# Patient Record
Sex: MALE | Race: WHITE | URBAN - METROPOLITAN AREA
[De-identification: names, ages, dates, MRNs, and addresses within clinical notes are randomized per-mention and may not be internally consistent; named-entity substitution may affect disease eponyms.]

---

## 2018-08-11 ENCOUNTER — TRANSFERRED RECORDS (OUTPATIENT)
Dept: HEALTH INFORMATION MANAGEMENT | Facility: CLINIC | Age: 28
End: 2018-08-11

## 2020-07-19 ENCOUNTER — TRANSFERRED RECORDS (OUTPATIENT)
Dept: HEALTH INFORMATION MANAGEMENT | Facility: CLINIC | Age: 30
End: 2020-07-19

## 2024-01-01 ENCOUNTER — APPOINTMENT (OUTPATIENT)
Dept: CARDIOLOGY | Facility: CLINIC | Age: 34
DRG: 407 | End: 2024-01-01
Payer: COMMERCIAL

## 2024-01-01 ENCOUNTER — APPOINTMENT (OUTPATIENT)
Dept: GENERAL RADIOLOGY | Facility: CLINIC | Age: 34
DRG: 407 | End: 2024-01-01
Payer: COMMERCIAL

## 2024-01-01 PROCEDURE — 71045 X-RAY EXAM CHEST 1 VIEW: CPT

## 2024-01-01 PROCEDURE — 93306 TTE W/DOPPLER COMPLETE: CPT | Mod: 26 | Performed by: INTERNAL MEDICINE

## 2024-01-01 PROCEDURE — 71045 X-RAY EXAM CHEST 1 VIEW: CPT | Mod: 76

## 2024-01-01 PROCEDURE — 71045 X-RAY EXAM CHEST 1 VIEW: CPT | Mod: 26 | Performed by: RADIOLOGY

## 2024-01-01 PROCEDURE — 999N000208 ECHOCARDIOGRAM COMPLETE

## 2024-12-26 ENCOUNTER — HOSPITAL ENCOUNTER (INPATIENT)
Facility: CLINIC | Age: 34
End: 2024-12-26
Attending: SURGERY | Admitting: SURGERY
Payer: COMMERCIAL

## 2024-12-26 LAB
ALBUMIN SERPL BCG-MCNC: 3.1 G/DL (ref 3.5–5.2)
ALLEN'S TEST: ABNORMAL
ALLEN'S TEST: ABNORMAL
ALP SERPL-CCNC: 53 U/L (ref 40–150)
ALT SERPL W P-5'-P-CCNC: 9 U/L (ref 0–70)
AMYLASE SERPL-CCNC: 42 U/L (ref 28–100)
ANION GAP SERPL CALCULATED.3IONS-SCNC: 8 MMOL/L (ref 7–15)
APTT PPP: 29 SECONDS (ref 22–38)
AST SERPL W P-5'-P-CCNC: 27 U/L (ref 0–45)
ATRIAL RATE - MUSE: 78 BPM
BASE EXCESS BLDA CALC-SCNC: 2 MMOL/L (ref -3–3)
BASE EXCESS BLDA CALC-SCNC: 2.2 MMOL/L (ref -3–3)
BASOPHILS # BLD AUTO: 0 10E3/UL (ref 0–0.2)
BASOPHILS NFR BLD AUTO: 0 %
BILIRUB DIRECT SERPL-MCNC: 0.22 MG/DL (ref 0–0.3)
BILIRUB SERPL-MCNC: 0.6 MG/DL
BLD GP AB SCN SERPL QL: NEGATIVE
BLD PROD TYP BPU: NORMAL
BLOOD COMPONENT TYPE: NORMAL
BUN SERPL-MCNC: 9.3 MG/DL (ref 6–20)
CA-I BLD-MCNC: 4.6 MG/DL (ref 4.4–5.2)
CALCIUM SERPL-MCNC: 8.3 MG/DL (ref 8.8–10.4)
CHLORIDE SERPL-SCNC: 110 MMOL/L (ref 98–107)
CK SERPL-CCNC: 196 U/L (ref 39–308)
CODING SYSTEM: NORMAL
CREAT SERPL-MCNC: 0.66 MG/DL (ref 0.67–1.17)
CROSSMATCH: NORMAL
DIASTOLIC BLOOD PRESSURE - MUSE: NORMAL MMHG
EGFRCR SERPLBLD CKD-EPI 2021: >90 ML/MIN/1.73M2
EOSINOPHIL # BLD AUTO: 0 10E3/UL (ref 0–0.7)
EOSINOPHIL NFR BLD AUTO: 0 %
ERYTHROCYTE [DISTWIDTH] IN BLOOD BY AUTOMATED COUNT: 15.9 % (ref 10–15)
FIBRINOGEN PPP-MCNC: 566 MG/DL (ref 170–510)
GGT SERPL-CCNC: 49 U/L (ref 8–61)
GLUCOSE BLDC GLUCOMTR-MCNC: 127 MG/DL (ref 70–99)
GLUCOSE BLDC GLUCOMTR-MCNC: 137 MG/DL (ref 70–99)
GLUCOSE BLDC GLUCOMTR-MCNC: 138 MG/DL (ref 70–99)
GLUCOSE SERPL-MCNC: 161 MG/DL (ref 70–99)
GRAM STAIN RESULT: ABNORMAL
GRAM STAIN RESULT: ABNORMAL
HCO3 BLD-SCNC: 26 MMOL/L (ref 21–28)
HCO3 BLD-SCNC: 26 MMOL/L (ref 21–28)
HCO3 SERPL-SCNC: 24 MMOL/L (ref 22–29)
HCT VFR BLD AUTO: 20.1 % (ref 40–53)
HGB BLD-MCNC: 6.7 G/DL (ref 13.3–17.7)
IMM GRANULOCYTES # BLD: 0.1 10E3/UL
IMM GRANULOCYTES NFR BLD: 1 %
INR PPP: 1.1 (ref 0.85–1.15)
INTERPRETATION ECG - MUSE: NORMAL
ISSUE DATE AND TIME: NORMAL
LACTATE SERPL-SCNC: 1.6 MMOL/L (ref 0.7–2)
LDH SERPL L TO P-CCNC: 241 U/L (ref 0–250)
LIPASE SERPL-CCNC: 70 U/L (ref 13–60)
LYMPHOCYTES # BLD AUTO: 0.7 10E3/UL (ref 0.8–5.3)
LYMPHOCYTES NFR BLD AUTO: 7 %
MAGNESIUM SERPL-MCNC: 2.2 MG/DL (ref 1.7–2.3)
MCH RBC QN AUTO: 30.7 PG (ref 26.5–33)
MCHC RBC AUTO-ENTMCNC: 33.3 G/DL (ref 31.5–36.5)
MCV RBC AUTO: 92 FL (ref 78–100)
MONOCYTES # BLD AUTO: 0.3 10E3/UL (ref 0–1.3)
MONOCYTES NFR BLD AUTO: 3 %
NEUTROPHILS # BLD AUTO: 9.2 10E3/UL (ref 1.6–8.3)
NEUTROPHILS NFR BLD AUTO: 90 %
NRBC # BLD AUTO: 0 10E3/UL
NRBC BLD AUTO-RTO: 0 /100
O2/TOTAL GAS SETTING VFR VENT: 100 %
O2/TOTAL GAS SETTING VFR VENT: 30 %
OXYHGB MFR BLDA: 97 % (ref 92–100)
OXYHGB MFR BLDA: 99 % (ref 92–100)
P AXIS - MUSE: 65 DEGREES
PCO2 BLD: 36 MM HG (ref 35–45)
PCO2 BLD: 37 MM HG (ref 35–45)
PEEP: 10 CM H2O
PEEP: 5 CM H2O
PH BLD: 7.45 [PH] (ref 7.35–7.45)
PH BLD: 7.46 [PH] (ref 7.35–7.45)
PHOSPHATE SERPL-MCNC: 2.2 MG/DL (ref 2.5–4.5)
PHOSPHATE SERPL-MCNC: 2.7 MG/DL (ref 2.5–4.5)
PLATELET # BLD AUTO: 108 10E3/UL (ref 150–450)
PO2 BLD: 305 MM HG (ref 80–105)
PO2 BLD: 92 MM HG (ref 80–105)
POTASSIUM SERPL-SCNC: 3.8 MMOL/L (ref 3.4–5.3)
PR INTERVAL - MUSE: 136 MS
PROT SERPL-MCNC: 5.3 G/DL (ref 6.4–8.3)
QRS DURATION - MUSE: 96 MS
QT - MUSE: 416 MS
QTC - MUSE: 474 MS
R AXIS - MUSE: 66 DEGREES
RBC # BLD AUTO: 2.18 10E6/UL (ref 4.4–5.9)
SAO2 % BLDA: 99.3 % (ref 96–97)
SAO2 % BLDA: >100 % (ref 96–97)
SODIUM SERPL-SCNC: 142 MMOL/L (ref 135–145)
SPECIMEN EXP DATE BLD: NORMAL
SPECIMEN EXP DATE BLD: NORMAL
SYSTOLIC BLOOD PRESSURE - MUSE: NORMAL MMHG
T AXIS - MUSE: 50 DEGREES
TROPONIN T SERPL HS-MCNC: <6 NG/L
UNIT ABO/RH: NORMAL
UNIT NUMBER: NORMAL
UNIT STATUS: NORMAL
UNIT TYPE ISBT: 5100
VENTRICULAR RATE- MUSE: 78 BPM
WBC # BLD AUTO: 10.3 10E3/UL (ref 4–11)

## 2024-12-26 PROCEDURE — 93005 ELECTROCARDIOGRAM TRACING: CPT

## 2024-12-26 PROCEDURE — 85610 PROTHROMBIN TIME: CPT

## 2024-12-26 PROCEDURE — 85018 HEMOGLOBIN: CPT

## 2024-12-26 PROCEDURE — 94002 VENT MGMT INPAT INIT DAY: CPT

## 2024-12-26 PROCEDURE — 85025 COMPLETE CBC W/AUTO DIFF WBC: CPT

## 2024-12-26 PROCEDURE — 83690 ASSAY OF LIPASE: CPT

## 2024-12-26 PROCEDURE — 84075 ASSAY ALKALINE PHOSPHATASE: CPT

## 2024-12-26 PROCEDURE — 82977 ASSAY OF GGT: CPT

## 2024-12-26 PROCEDURE — 85384 FIBRINOGEN ACTIVITY: CPT

## 2024-12-26 PROCEDURE — 83615 LACTATE (LD) (LDH) ENZYME: CPT

## 2024-12-26 PROCEDURE — 87070 CULTURE OTHR SPECIMN AEROBIC: CPT

## 2024-12-26 PROCEDURE — 258N000003 HC RX IP 258 OP 636

## 2024-12-26 PROCEDURE — 86850 RBC ANTIBODY SCREEN: CPT

## 2024-12-26 PROCEDURE — 200N000002 HC R&B ICU UMMC

## 2024-12-26 PROCEDURE — 82374 ASSAY BLOOD CARBON DIOXIDE: CPT

## 2024-12-26 PROCEDURE — 93010 ELECTROCARDIOGRAM REPORT: CPT | Performed by: INTERNAL MEDICINE

## 2024-12-26 PROCEDURE — 84484 ASSAY OF TROPONIN QUANT: CPT

## 2024-12-26 PROCEDURE — 86923 COMPATIBILITY TEST ELECTRIC: CPT

## 2024-12-26 PROCEDURE — 83735 ASSAY OF MAGNESIUM: CPT

## 2024-12-26 PROCEDURE — 84155 ASSAY OF PROTEIN SERUM: CPT

## 2024-12-26 PROCEDURE — 82805 BLOOD GASES W/O2 SATURATION: CPT

## 2024-12-26 PROCEDURE — 82962 GLUCOSE BLOOD TEST: CPT

## 2024-12-26 PROCEDURE — 82435 ASSAY OF BLOOD CHLORIDE: CPT

## 2024-12-26 PROCEDURE — 82248 BILIRUBIN DIRECT: CPT

## 2024-12-26 PROCEDURE — 250N000011 HC RX IP 250 OP 636

## 2024-12-26 PROCEDURE — 85730 THROMBOPLASTIN TIME PARTIAL: CPT

## 2024-12-26 PROCEDURE — 82247 BILIRUBIN TOTAL: CPT

## 2024-12-26 PROCEDURE — 83605 ASSAY OF LACTIC ACID: CPT

## 2024-12-26 PROCEDURE — 84100 ASSAY OF PHOSPHORUS: CPT

## 2024-12-26 PROCEDURE — 250N000009 HC RX 250

## 2024-12-26 PROCEDURE — 94640 AIRWAY INHALATION TREATMENT: CPT | Mod: 76

## 2024-12-26 PROCEDURE — 250N000011 HC RX IP 250 OP 636: Performed by: SURGERY

## 2024-12-26 PROCEDURE — 94799 UNLISTED PULMONARY SVC/PX: CPT

## 2024-12-26 PROCEDURE — 999N000157 HC STATISTIC RCP TIME EA 10 MIN

## 2024-12-26 PROCEDURE — 86900 BLOOD TYPING SEROLOGIC ABO: CPT

## 2024-12-26 PROCEDURE — 82150 ASSAY OF AMYLASE: CPT

## 2024-12-26 PROCEDURE — 82330 ASSAY OF CALCIUM: CPT

## 2024-12-26 PROCEDURE — 82550 ASSAY OF CK (CPK): CPT

## 2024-12-26 RX ORDER — VANCOMYCIN HYDROCHLORIDE
1500 EVERY 12 HOURS
Status: DISPENSED | OUTPATIENT
Start: 2024-12-26

## 2024-12-26 RX ORDER — NOREPINEPHRINE BITARTRATE 0.06 MG/ML
.01-.6 INJECTION, SOLUTION INTRAVENOUS CONTINUOUS
Status: ACTIVE | OUTPATIENT
Start: 2024-12-26

## 2024-12-26 RX ORDER — IPRATROPIUM BROMIDE AND ALBUTEROL SULFATE 2.5; .5 MG/3ML; MG/3ML
3 SOLUTION RESPIRATORY (INHALATION) EVERY 4 HOURS
Status: DISPENSED | OUTPATIENT
Start: 2024-12-26

## 2024-12-26 RX ORDER — PHENYLEPHRINE HCL IN 0.9% NACL 50MG/250ML
.5-6 PLASTIC BAG, INJECTION (ML) INTRAVENOUS CONTINUOUS
Status: ACTIVE | OUTPATIENT
Start: 2024-12-26

## 2024-12-26 RX ORDER — DEXTROSE MONOHYDRATE AND SODIUM CHLORIDE 5; .45 G/100ML; G/100ML
INJECTION, SOLUTION INTRAVENOUS CONTINUOUS
Status: ACTIVE | OUTPATIENT
Start: 2024-12-26

## 2024-12-26 RX ORDER — IRRIGATION SET
1200 IRRIGATION SET MISCELLANEOUS EVERY 24 HOURS
Status: DISPENSED | OUTPATIENT
Start: 2024-12-27

## 2024-12-26 RX ORDER — HYDROCORTISONE SODIUM SUCCINATE 100 MG/2ML
100 INJECTION INTRAMUSCULAR; INTRAVENOUS EVERY 8 HOURS
Status: DISPENSED | OUTPATIENT
Start: 2024-12-26 | End: 2024-12-28

## 2024-12-26 RX ADMIN — PIPERACILLIN SODIUM AND TAZOBACTAM SODIUM 4.5 G: 4; .5 INJECTION, SOLUTION INTRAVENOUS at 21:59

## 2024-12-26 RX ADMIN — VASOPRESSIN 0.5 UNITS/HR: 20 INJECTION, SOLUTION INTRAMUSCULAR; SUBCUTANEOUS at 19:04

## 2024-12-26 RX ADMIN — DEXTROSE AND SODIUM CHLORIDE: 5; 450 INJECTION, SOLUTION INTRAVENOUS at 19:08

## 2024-12-26 RX ADMIN — IPRATROPIUM BROMIDE AND ALBUTEROL SULFATE 3 ML: .5; 3 SOLUTION RESPIRATORY (INHALATION) at 20:03

## 2024-12-26 RX ADMIN — HYDROCORTISONE SODIUM SUCCINATE 100 MG: 100 INJECTION, POWDER, FOR SOLUTION INTRAMUSCULAR; INTRAVENOUS at 20:55

## 2024-12-26 RX ADMIN — Medication 0.5 MCG/KG/MIN: at 19:06

## 2024-12-26 RX ADMIN — Medication 1500 MG: at 23:07

## 2024-12-26 ASSESSMENT — ACTIVITIES OF DAILY LIVING (ADL)
ADLS_ACUITY_SCORE: 41
ADLS_ACUITY_SCORE: 41
ADLS_ACUITY_SCORE: 59
ADLS_ACUITY_SCORE: 59
ADLS_ACUITY_SCORE: 53

## 2024-12-27 ENCOUNTER — APPOINTMENT (OUTPATIENT)
Dept: GENERAL RADIOLOGY | Facility: CLINIC | Age: 34
DRG: 407 | End: 2024-12-27
Payer: COMMERCIAL

## 2024-12-27 ENCOUNTER — APPOINTMENT (OUTPATIENT)
Dept: CT IMAGING | Facility: CLINIC | Age: 34
DRG: 407 | End: 2024-12-27
Payer: COMMERCIAL

## 2024-12-27 ENCOUNTER — ANESTHESIA EVENT (OUTPATIENT)
Dept: SURGERY | Facility: CLINIC | Age: 34
DRG: 407 | End: 2024-12-27
Payer: COMMERCIAL

## 2024-12-27 VITALS
HEART RATE: 78 BPM | TEMPERATURE: 98.8 F | HEIGHT: 73 IN | BODY MASS INDEX: 34.97 KG/M2 | OXYGEN SATURATION: 99 % | WEIGHT: 263.82 LBS | RESPIRATION RATE: 14 BRPM

## 2024-12-27 LAB
ALBUMIN SERPL BCG-MCNC: 3.1 G/DL (ref 3.5–5.2)
ALBUMIN SERPL BCG-MCNC: 3.2 G/DL (ref 3.5–5.2)
ALBUMIN UR-MCNC: NEGATIVE MG/DL
ALLEN'S TEST: ABNORMAL
ALLEN'S TEST: NO
ALP SERPL-CCNC: 54 U/L (ref 40–150)
ALP SERPL-CCNC: 57 U/L (ref 40–150)
ALP SERPL-CCNC: 61 U/L (ref 40–150)
ALP SERPL-CCNC: 64 U/L (ref 40–150)
ALT SERPL W P-5'-P-CCNC: 10 U/L (ref 0–70)
ALT SERPL W P-5'-P-CCNC: 10 U/L (ref 0–70)
ALT SERPL W P-5'-P-CCNC: 12 U/L (ref 0–70)
ALT SERPL W P-5'-P-CCNC: 9 U/L (ref 0–70)
AMYLASE SERPL-CCNC: 47 U/L (ref 28–100)
AMYLASE SERPL-CCNC: 62 U/L (ref 28–100)
AMYLASE SERPL-CCNC: 71 U/L (ref 28–100)
AMYLASE SERPL-CCNC: 77 U/L (ref 28–100)
ANION GAP SERPL CALCULATED.3IONS-SCNC: 8 MMOL/L (ref 7–15)
ANION GAP SERPL CALCULATED.3IONS-SCNC: 8 MMOL/L (ref 7–15)
ANION GAP SERPL CALCULATED.3IONS-SCNC: 9 MMOL/L (ref 7–15)
ANION GAP SERPL CALCULATED.3IONS-SCNC: 9 MMOL/L (ref 7–15)
APPEARANCE UR: CLEAR
APTT PPP: 29 SECONDS (ref 22–38)
APTT PPP: 31 SECONDS (ref 22–38)
APTT PPP: 32 SECONDS (ref 22–38)
APTT PPP: 33 SECONDS (ref 22–38)
AST SERPL W P-5'-P-CCNC: 28 U/L (ref 0–45)
AST SERPL W P-5'-P-CCNC: 36 U/L (ref 0–45)
AST SERPL W P-5'-P-CCNC: 40 U/L (ref 0–45)
AST SERPL W P-5'-P-CCNC: 46 U/L (ref 0–45)
BASE EXCESS BLDA CALC-SCNC: 2.4 MMOL/L (ref -3–3)
BASE EXCESS BLDA CALC-SCNC: 2.8 MMOL/L (ref -3–3)
BASE EXCESS BLDA CALC-SCNC: 2.9 MMOL/L (ref -3–3)
BASE EXCESS BLDA CALC-SCNC: 3 MMOL/L (ref -3–3)
BASE EXCESS BLDA CALC-SCNC: 3.6 MMOL/L (ref -3–3)
BASE EXCESS BLDA CALC-SCNC: 4.1 MMOL/L (ref -3–3)
BASE EXCESS BLDA CALC-SCNC: 4.6 MMOL/L (ref -3–3)
BASE EXCESS BLDA CALC-SCNC: 5.5 MMOL/L (ref -3–3)
BASOPHILS # BLD AUTO: 0 10E3/UL (ref 0–0.2)
BASOPHILS NFR BLD AUTO: 0 %
BILIRUB DIRECT SERPL-MCNC: 0.21 MG/DL (ref 0–0.3)
BILIRUB DIRECT SERPL-MCNC: 0.22 MG/DL (ref 0–0.3)
BILIRUB DIRECT SERPL-MCNC: 0.24 MG/DL (ref 0–0.3)
BILIRUB DIRECT SERPL-MCNC: <0.2 MG/DL (ref 0–0.3)
BILIRUB SERPL-MCNC: 0.5 MG/DL
BILIRUB UR QL STRIP: NEGATIVE
BUN SERPL-MCNC: 9.3 MG/DL (ref 6–20)
BUN SERPL-MCNC: 9.6 MG/DL (ref 6–20)
BUN SERPL-MCNC: 9.7 MG/DL (ref 6–20)
BUN SERPL-MCNC: 9.8 MG/DL (ref 6–20)
CA-I BLD-MCNC: 4.4 MG/DL (ref 4.4–5.2)
CA-I BLD-MCNC: 4.5 MG/DL (ref 4.4–5.2)
CA-I BLD-MCNC: 4.5 MG/DL (ref 4.4–5.2)
CA-I BLD-MCNC: 4.6 MG/DL (ref 4.4–5.2)
CALCIUM SERPL-MCNC: 8.1 MG/DL (ref 8.8–10.4)
CALCIUM SERPL-MCNC: 8.5 MG/DL (ref 8.8–10.4)
CALCIUM SERPL-MCNC: 8.6 MG/DL (ref 8.8–10.4)
CALCIUM SERPL-MCNC: 8.7 MG/DL (ref 8.8–10.4)
CHLORIDE SERPL-SCNC: 110 MMOL/L (ref 98–107)
CHLORIDE SERPL-SCNC: 111 MMOL/L (ref 98–107)
CHLORIDE SERPL-SCNC: 112 MMOL/L (ref 98–107)
CHLORIDE SERPL-SCNC: 115 MMOL/L (ref 98–107)
CK SERPL-CCNC: 226 U/L (ref 39–308)
CK SERPL-CCNC: 319 U/L (ref 39–308)
CK SERPL-CCNC: 369 U/L (ref 39–308)
CK SERPL-CCNC: 435 U/L (ref 39–308)
COLOR UR AUTO: NORMAL
CREAT SERPL-MCNC: 0.64 MG/DL (ref 0.67–1.17)
CREAT SERPL-MCNC: 0.65 MG/DL (ref 0.67–1.17)
CREAT SERPL-MCNC: 0.68 MG/DL (ref 0.67–1.17)
CREAT SERPL-MCNC: 0.7 MG/DL (ref 0.67–1.17)
EGFRCR SERPLBLD CKD-EPI 2021: >90 ML/MIN/1.73M2
EOSINOPHIL # BLD AUTO: 0 10E3/UL (ref 0–0.7)
EOSINOPHIL NFR BLD AUTO: 0 %
ERYTHROCYTE [DISTWIDTH] IN BLOOD BY AUTOMATED COUNT: 16 % (ref 10–15)
ERYTHROCYTE [DISTWIDTH] IN BLOOD BY AUTOMATED COUNT: 16.3 % (ref 10–15)
FIBRINOGEN PPP-MCNC: 579 MG/DL (ref 170–510)
FIBRINOGEN PPP-MCNC: 604 MG/DL (ref 170–510)
FIBRINOGEN PPP-MCNC: 666 MG/DL (ref 170–510)
FIBRINOGEN PPP-MCNC: 681 MG/DL (ref 170–510)
GGT SERPL-CCNC: 105 U/L (ref 8–61)
GGT SERPL-CCNC: 59 U/L (ref 8–61)
GGT SERPL-CCNC: 68 U/L (ref 8–61)
GGT SERPL-CCNC: 84 U/L (ref 8–61)
GLUCOSE BLDC GLUCOMTR-MCNC: 122 MG/DL (ref 70–99)
GLUCOSE BLDC GLUCOMTR-MCNC: 134 MG/DL (ref 70–99)
GLUCOSE SERPL-MCNC: 126 MG/DL (ref 70–99)
GLUCOSE SERPL-MCNC: 131 MG/DL (ref 70–99)
GLUCOSE SERPL-MCNC: 132 MG/DL (ref 70–99)
GLUCOSE SERPL-MCNC: 141 MG/DL (ref 70–99)
GLUCOSE UR STRIP-MCNC: NEGATIVE MG/DL
HCO3 BLD-SCNC: 27 MMOL/L (ref 21–28)
HCO3 BLD-SCNC: 28 MMOL/L (ref 21–28)
HCO3 BLD-SCNC: 29 MMOL/L (ref 21–28)
HCO3 BLD-SCNC: 29 MMOL/L (ref 21–28)
HCO3 SERPL-SCNC: 24 MMOL/L (ref 22–29)
HCO3 SERPL-SCNC: 25 MMOL/L (ref 22–29)
HCO3 SERPL-SCNC: 25 MMOL/L (ref 22–29)
HCO3 SERPL-SCNC: 26 MMOL/L (ref 22–29)
HCT VFR BLD AUTO: 20.7 % (ref 40–53)
HCT VFR BLD AUTO: 21.6 % (ref 40–53)
HCT VFR BLD AUTO: 21.7 % (ref 40–53)
HCT VFR BLD AUTO: 21.8 % (ref 40–53)
HGB BLD-MCNC: 7.1 G/DL (ref 13.3–17.7)
HGB BLD-MCNC: 7.2 G/DL (ref 13.3–17.7)
HGB BLD-MCNC: 7.3 G/DL (ref 13.3–17.7)
HGB BLD-MCNC: 7.7 G/DL (ref 13.3–17.7)
HGB UR QL STRIP: NEGATIVE
IMM GRANULOCYTES # BLD: 0 10E3/UL
IMM GRANULOCYTES # BLD: 0 10E3/UL
IMM GRANULOCYTES # BLD: 0.1 10E3/UL
IMM GRANULOCYTES # BLD: 0.1 10E3/UL
IMM GRANULOCYTES NFR BLD: 0 %
IMM GRANULOCYTES NFR BLD: 1 %
INR PPP: 1.05 (ref 0.85–1.15)
INR PPP: 1.07 (ref 0.85–1.15)
INR PPP: 1.1 (ref 0.85–1.15)
INR PPP: 1.11 (ref 0.85–1.15)
KETONES UR STRIP-MCNC: NEGATIVE MG/DL
LACTATE SERPL-SCNC: 0.8 MMOL/L (ref 0.7–2)
LACTATE SERPL-SCNC: 0.9 MMOL/L (ref 0.7–2)
LACTATE SERPL-SCNC: 1.1 MMOL/L (ref 0.7–2)
LACTATE SERPL-SCNC: 1.2 MMOL/L (ref 0.7–2)
LDH SERPL L TO P-CCNC: 230 U/L (ref 0–250)
LDH SERPL L TO P-CCNC: 236 U/L (ref 0–250)
LDH SERPL L TO P-CCNC: 246 U/L (ref 0–250)
LDH SERPL L TO P-CCNC: 250 U/L (ref 0–250)
LEUKOCYTE ESTERASE UR QL STRIP: NEGATIVE
LIPASE SERPL-CCNC: 120 U/L (ref 13–60)
LIPASE SERPL-CCNC: 141 U/L (ref 13–60)
LIPASE SERPL-CCNC: 153 U/L (ref 13–60)
LIPASE SERPL-CCNC: 86 U/L (ref 13–60)
LVEF ECHO: NORMAL
LYMPHOCYTES # BLD AUTO: 0.7 10E3/UL (ref 0.8–5.3)
LYMPHOCYTES # BLD AUTO: 0.9 10E3/UL (ref 0.8–5.3)
LYMPHOCYTES # BLD AUTO: 1 10E3/UL (ref 0.8–5.3)
LYMPHOCYTES # BLD AUTO: 1 10E3/UL (ref 0.8–5.3)
LYMPHOCYTES NFR BLD AUTO: 10 %
LYMPHOCYTES NFR BLD AUTO: 12 %
LYMPHOCYTES NFR BLD AUTO: 8 %
LYMPHOCYTES NFR BLD AUTO: 8 %
MAGNESIUM SERPL-MCNC: 2.2 MG/DL (ref 1.7–2.3)
MAGNESIUM SERPL-MCNC: 2.3 MG/DL (ref 1.7–2.3)
MAGNESIUM SERPL-MCNC: 2.3 MG/DL (ref 1.7–2.3)
MAGNESIUM SERPL-MCNC: 2.4 MG/DL (ref 1.7–2.3)
MCH RBC QN AUTO: 30.5 PG (ref 26.5–33)
MCH RBC QN AUTO: 30.8 PG (ref 26.5–33)
MCH RBC QN AUTO: 31 PG (ref 26.5–33)
MCH RBC QN AUTO: 31.7 PG (ref 26.5–33)
MCHC RBC AUTO-ENTMCNC: 33.3 G/DL (ref 31.5–36.5)
MCHC RBC AUTO-ENTMCNC: 33.6 G/DL (ref 31.5–36.5)
MCHC RBC AUTO-ENTMCNC: 34.3 G/DL (ref 31.5–36.5)
MCHC RBC AUTO-ENTMCNC: 35.3 G/DL (ref 31.5–36.5)
MCV RBC AUTO: 90 FL (ref 78–100)
MCV RBC AUTO: 90 FL (ref 78–100)
MCV RBC AUTO: 92 FL (ref 78–100)
MCV RBC AUTO: 92 FL (ref 78–100)
MONOCYTES # BLD AUTO: 0.4 10E3/UL (ref 0–1.3)
MONOCYTES # BLD AUTO: 0.5 10E3/UL (ref 0–1.3)
MONOCYTES # BLD AUTO: 0.6 10E3/UL (ref 0–1.3)
MONOCYTES # BLD AUTO: 0.6 10E3/UL (ref 0–1.3)
MONOCYTES NFR BLD AUTO: 4 %
MONOCYTES NFR BLD AUTO: 5 %
MONOCYTES NFR BLD AUTO: 5 %
MONOCYTES NFR BLD AUTO: 7 %
NEUTROPHILS # BLD AUTO: 7.2 10E3/UL (ref 1.6–8.3)
NEUTROPHILS # BLD AUTO: 8.5 10E3/UL (ref 1.6–8.3)
NEUTROPHILS # BLD AUTO: 8.7 10E3/UL (ref 1.6–8.3)
NEUTROPHILS # BLD AUTO: 9 10E3/UL (ref 1.6–8.3)
NEUTROPHILS NFR BLD AUTO: 81 %
NEUTROPHILS NFR BLD AUTO: 85 %
NEUTROPHILS NFR BLD AUTO: 85 %
NEUTROPHILS NFR BLD AUTO: 88 %
NITRATE UR QL: NEGATIVE
NRBC # BLD AUTO: 0 10E3/UL
NRBC BLD AUTO-RTO: 0 /100
O2/TOTAL GAS SETTING VFR VENT: 100 %
O2/TOTAL GAS SETTING VFR VENT: 30 %
O2/TOTAL GAS SETTING VFR VENT: 30 %
OXYHGB MFR BLDA: 100 % (ref 92–100)
OXYHGB MFR BLDA: 100 % (ref 92–100)
OXYHGB MFR BLDA: 98 % (ref 92–100)
OXYHGB MFR BLDA: 98 % (ref 92–100)
OXYHGB MFR BLDA: 99 % (ref 92–100)
PCO2 BLD: 36 MM HG (ref 35–45)
PCO2 BLD: 37 MM HG (ref 35–45)
PCO2 BLD: 38 MM HG (ref 35–45)
PCO2 BLD: 39 MM HG (ref 35–45)
PCO2 BLD: 40 MM HG (ref 35–45)
PCO2 BLD: 40 MM HG (ref 35–45)
PEEP: 10 CM H2O
PEEP: 10 CM H2O
PEEP: 5 CM H2O
PH BLD: 7.45 [PH] (ref 7.35–7.45)
PH BLD: 7.45 [PH] (ref 7.35–7.45)
PH BLD: 7.46 [PH] (ref 7.35–7.45)
PH BLD: 7.46 [PH] (ref 7.35–7.45)
PH BLD: 7.47 [PH] (ref 7.35–7.45)
PH BLD: 7.47 [PH] (ref 7.35–7.45)
PH BLD: 7.48 [PH] (ref 7.35–7.45)
PH BLD: 7.49 [PH] (ref 7.35–7.45)
PH UR STRIP: 7 [PH] (ref 5–7)
PHOSPHATE SERPL-MCNC: 2.6 MG/DL (ref 2.5–4.5)
PHOSPHATE SERPL-MCNC: 2.6 MG/DL (ref 2.5–4.5)
PHOSPHATE SERPL-MCNC: 2.9 MG/DL (ref 2.5–4.5)
PHOSPHATE SERPL-MCNC: 2.9 MG/DL (ref 2.5–4.5)
PLATELET # BLD AUTO: 104 10E3/UL (ref 150–450)
PLATELET # BLD AUTO: 117 10E3/UL (ref 150–450)
PLATELET # BLD AUTO: 136 10E3/UL (ref 150–450)
PLATELET # BLD AUTO: 143 10E3/UL (ref 150–450)
PO2 BLD: 112 MM HG (ref 80–105)
PO2 BLD: 128 MM HG (ref 80–105)
PO2 BLD: 314 MM HG (ref 80–105)
PO2 BLD: 358 MM HG (ref 80–105)
PO2 BLD: 360 MM HG (ref 80–105)
PO2 BLD: 363 MM HG (ref 80–105)
PO2 BLD: 409 MM HG (ref 80–105)
PO2 BLD: 455 MM HG (ref 80–105)
POTASSIUM SERPL-SCNC: 3.4 MMOL/L (ref 3.4–5.3)
POTASSIUM SERPL-SCNC: 3.5 MMOL/L (ref 3.4–5.3)
POTASSIUM SERPL-SCNC: 3.6 MMOL/L (ref 3.4–5.3)
POTASSIUM SERPL-SCNC: 3.7 MMOL/L (ref 3.4–5.3)
PROT SERPL-MCNC: 5.3 G/DL (ref 6.4–8.3)
PROT SERPL-MCNC: 5.4 G/DL (ref 6.4–8.3)
PROT SERPL-MCNC: 5.5 G/DL (ref 6.4–8.3)
PROT SERPL-MCNC: 5.7 G/DL (ref 6.4–8.3)
RBC # BLD AUTO: 2.29 10E6/UL (ref 4.4–5.9)
RBC # BLD AUTO: 2.36 10E6/UL (ref 4.4–5.9)
RBC # BLD AUTO: 2.37 10E6/UL (ref 4.4–5.9)
RBC # BLD AUTO: 2.43 10E6/UL (ref 4.4–5.9)
RBC URINE: <1 /HPF
SAO2 % BLDA: >100 % (ref 96–97)
SODIUM SERPL-SCNC: 142 MMOL/L (ref 135–145)
SODIUM SERPL-SCNC: 145 MMOL/L (ref 135–145)
SODIUM SERPL-SCNC: 146 MMOL/L (ref 135–145)
SODIUM SERPL-SCNC: 149 MMOL/L (ref 135–145)
SP GR UR STRIP: 1.01 (ref 1–1.03)
TROPONIN T SERPL HS-MCNC: 12 NG/L
TROPONIN T SERPL HS-MCNC: 9 NG/L
TROPONIN T SERPL HS-MCNC: <6 NG/L
TROPONIN T SERPL HS-MCNC: <6 NG/L
UROBILINOGEN UR STRIP-MCNC: NORMAL MG/DL
WBC # BLD AUTO: 10.3 10E3/UL (ref 4–11)
WBC # BLD AUTO: 10.5 10E3/UL (ref 4–11)
WBC # BLD AUTO: 8.9 10E3/UL (ref 4–11)
WBC # BLD AUTO: 9.8 10E3/UL (ref 4–11)
WBC URINE: 3 /HPF

## 2024-12-27 PROCEDURE — 250N000011 HC RX IP 250 OP 636

## 2024-12-27 PROCEDURE — 83690 ASSAY OF LIPASE: CPT

## 2024-12-27 PROCEDURE — 82040 ASSAY OF SERUM ALBUMIN: CPT

## 2024-12-27 PROCEDURE — 71250 CT THORAX DX C-: CPT | Mod: 26 | Performed by: RADIOLOGY

## 2024-12-27 PROCEDURE — 84075 ASSAY ALKALINE PHOSPHATASE: CPT

## 2024-12-27 PROCEDURE — 84460 ALANINE AMINO (ALT) (SGPT): CPT

## 2024-12-27 PROCEDURE — 82330 ASSAY OF CALCIUM: CPT

## 2024-12-27 PROCEDURE — 85730 THROMBOPLASTIN TIME PARTIAL: CPT

## 2024-12-27 PROCEDURE — 94640 AIRWAY INHALATION TREATMENT: CPT

## 2024-12-27 PROCEDURE — 82805 BLOOD GASES W/O2 SATURATION: CPT

## 2024-12-27 PROCEDURE — 83735 ASSAY OF MAGNESIUM: CPT

## 2024-12-27 PROCEDURE — 84450 TRANSFERASE (AST) (SGOT): CPT

## 2024-12-27 PROCEDURE — 83605 ASSAY OF LACTIC ACID: CPT

## 2024-12-27 PROCEDURE — 80069 RENAL FUNCTION PANEL: CPT

## 2024-12-27 PROCEDURE — 84100 ASSAY OF PHOSPHORUS: CPT

## 2024-12-27 PROCEDURE — 71045 X-RAY EXAM CHEST 1 VIEW: CPT

## 2024-12-27 PROCEDURE — 80053 COMPREHEN METABOLIC PANEL: CPT

## 2024-12-27 PROCEDURE — 84484 ASSAY OF TROPONIN QUANT: CPT

## 2024-12-27 PROCEDURE — 82550 ASSAY OF CK (CPK): CPT

## 2024-12-27 PROCEDURE — 85610 PROTHROMBIN TIME: CPT

## 2024-12-27 PROCEDURE — 85384 FIBRINOGEN ACTIVITY: CPT

## 2024-12-27 PROCEDURE — 94799 UNLISTED PULMONARY SVC/PX: CPT

## 2024-12-27 PROCEDURE — 71045 X-RAY EXAM CHEST 1 VIEW: CPT | Mod: 26 | Performed by: RADIOLOGY

## 2024-12-27 PROCEDURE — 83615 LACTATE (LD) (LDH) ENZYME: CPT

## 2024-12-27 PROCEDURE — 71045 X-RAY EXAM CHEST 1 VIEW: CPT | Mod: 26 | Performed by: STUDENT IN AN ORGANIZED HEALTH CARE EDUCATION/TRAINING PROGRAM

## 2024-12-27 PROCEDURE — 258N000003 HC RX IP 258 OP 636

## 2024-12-27 PROCEDURE — 200N000002 HC R&B ICU UMMC

## 2024-12-27 PROCEDURE — 82248 BILIRUBIN DIRECT: CPT

## 2024-12-27 PROCEDURE — 71045 X-RAY EXAM CHEST 1 VIEW: CPT | Mod: 76

## 2024-12-27 PROCEDURE — 71250 CT THORAX DX C-: CPT

## 2024-12-27 PROCEDURE — 999N000254 HC STATISTIC VENTILATOR TRANSFER

## 2024-12-27 PROCEDURE — 82150 ASSAY OF AMYLASE: CPT

## 2024-12-27 PROCEDURE — 94640 AIRWAY INHALATION TREATMENT: CPT | Mod: 76

## 2024-12-27 PROCEDURE — 250N000009 HC RX 250

## 2024-12-27 PROCEDURE — 80048 BASIC METABOLIC PNL TOTAL CA: CPT

## 2024-12-27 PROCEDURE — 255N000002 HC RX 255 OP 636: Performed by: INTERNAL MEDICINE

## 2024-12-27 PROCEDURE — P9040 RBC LEUKOREDUCED IRRADIATED: HCPCS

## 2024-12-27 PROCEDURE — 81001 URINALYSIS AUTO W/SCOPE: CPT

## 2024-12-27 PROCEDURE — 250N000011 HC RX IP 250 OP 636: Performed by: SURGERY

## 2024-12-27 PROCEDURE — 85004 AUTOMATED DIFF WBC COUNT: CPT

## 2024-12-27 PROCEDURE — 82977 ASSAY OF GGT: CPT

## 2024-12-27 PROCEDURE — 999N000157 HC STATISTIC RCP TIME EA 10 MIN

## 2024-12-27 PROCEDURE — 94003 VENT MGMT INPAT SUBQ DAY: CPT

## 2024-12-27 PROCEDURE — 84155 ASSAY OF PROTEIN SERUM: CPT

## 2024-12-27 RX ORDER — POTASSIUM CHLORIDE 29.8 MG/ML
20 INJECTION INTRAVENOUS ONCE
Status: COMPLETED | OUTPATIENT
Start: 2024-12-28 | End: 2024-12-28

## 2024-12-27 RX ORDER — POTASSIUM CHLORIDE 29.8 MG/ML
20 INJECTION INTRAVENOUS ONCE
Status: COMPLETED | OUTPATIENT
Start: 2024-12-27 | End: 2024-12-27

## 2024-12-27 RX ORDER — POTASSIUM CHLORIDE 29.8 MG/ML
20 INJECTION INTRAVENOUS
Status: COMPLETED | OUTPATIENT
Start: 2024-12-27 | End: 2024-12-27

## 2024-12-27 RX ORDER — POTASSIUM CHLORIDE 29.8 MG/ML
20 INJECTION INTRAVENOUS
Status: COMPLETED | OUTPATIENT
Start: 2024-12-28 | End: 2024-12-28

## 2024-12-27 RX ADMIN — HYDROCORTISONE SODIUM SUCCINATE 100 MG: 100 INJECTION, POWDER, FOR SOLUTION INTRAMUSCULAR; INTRAVENOUS at 04:33

## 2024-12-27 RX ADMIN — PIPERACILLIN SODIUM AND TAZOBACTAM SODIUM 4.5 G: 4; .5 INJECTION, SOLUTION INTRAVENOUS at 03:55

## 2024-12-27 RX ADMIN — IPRATROPIUM BROMIDE AND ALBUTEROL SULFATE 3 ML: .5; 3 SOLUTION RESPIRATORY (INHALATION) at 15:56

## 2024-12-27 RX ADMIN — POTASSIUM CHLORIDE 20 MEQ: 29.8 INJECTION INTRAVENOUS at 13:19

## 2024-12-27 RX ADMIN — HUMAN ALBUMIN MICROSPHERES AND PERFLUTREN 6 ML: 10; .22 INJECTION, SOLUTION INTRAVENOUS at 00:20

## 2024-12-27 RX ADMIN — HYDROCORTISONE SODIUM SUCCINATE 100 MG: 100 INJECTION, POWDER, FOR SOLUTION INTRAMUSCULAR; INTRAVENOUS at 13:26

## 2024-12-27 RX ADMIN — POTASSIUM CHLORIDE 20 MEQ: 29.8 INJECTION INTRAVENOUS at 14:31

## 2024-12-27 RX ADMIN — PIPERACILLIN SODIUM AND TAZOBACTAM SODIUM 4.5 G: 4; .5 INJECTION, SOLUTION INTRAVENOUS at 16:20

## 2024-12-27 RX ADMIN — IPRATROPIUM BROMIDE AND ALBUTEROL SULFATE 3 ML: .5; 3 SOLUTION RESPIRATORY (INHALATION) at 20:03

## 2024-12-27 RX ADMIN — DEXTROSE AND SODIUM CHLORIDE: 5; 450 INJECTION, SOLUTION INTRAVENOUS at 02:30

## 2024-12-27 RX ADMIN — DEXTROSE AND SODIUM CHLORIDE: 5; 450 INJECTION, SOLUTION INTRAVENOUS at 19:49

## 2024-12-27 RX ADMIN — DEXTROSE AND SODIUM CHLORIDE: 5; 450 INJECTION, SOLUTION INTRAVENOUS at 10:47

## 2024-12-27 RX ADMIN — IPRATROPIUM BROMIDE AND ALBUTEROL SULFATE 3 ML: .5; 3 SOLUTION RESPIRATORY (INHALATION) at 23:52

## 2024-12-27 RX ADMIN — IPRATROPIUM BROMIDE AND ALBUTEROL SULFATE 3 ML: .5; 3 SOLUTION RESPIRATORY (INHALATION) at 04:04

## 2024-12-27 RX ADMIN — HYDROCORTISONE SODIUM SUCCINATE 100 MG: 100 INJECTION, POWDER, FOR SOLUTION INTRAMUSCULAR; INTRAVENOUS at 21:22

## 2024-12-27 RX ADMIN — IPRATROPIUM BROMIDE AND ALBUTEROL SULFATE 3 ML: .5; 3 SOLUTION RESPIRATORY (INHALATION) at 11:48

## 2024-12-27 RX ADMIN — FLUCONAZOLE 1200 MG: 2 INJECTION, SOLUTION INTRAVENOUS at 00:53

## 2024-12-27 RX ADMIN — PIPERACILLIN SODIUM AND TAZOBACTAM SODIUM 4.5 G: 4; .5 INJECTION, SOLUTION INTRAVENOUS at 10:47

## 2024-12-27 RX ADMIN — IPRATROPIUM BROMIDE AND ALBUTEROL SULFATE 3 ML: .5; 3 SOLUTION RESPIRATORY (INHALATION) at 08:00

## 2024-12-27 RX ADMIN — POTASSIUM CHLORIDE 20 MEQ: 29.8 INJECTION INTRAVENOUS at 01:37

## 2024-12-27 RX ADMIN — POTASSIUM CHLORIDE 20 MEQ: 29.8 INJECTION INTRAVENOUS at 19:22

## 2024-12-27 RX ADMIN — PIPERACILLIN SODIUM AND TAZOBACTAM SODIUM 4.5 G: 4; .5 INJECTION, SOLUTION INTRAVENOUS at 22:22

## 2024-12-27 RX ADMIN — POTASSIUM CHLORIDE 20 MEQ: 29.8 INJECTION INTRAVENOUS at 18:15

## 2024-12-27 RX ADMIN — Medication 1500 MG: at 23:18

## 2024-12-27 RX ADMIN — IPRATROPIUM BROMIDE AND ALBUTEROL SULFATE 3 ML: .5; 3 SOLUTION RESPIRATORY (INHALATION) at 00:31

## 2024-12-27 RX ADMIN — Medication 1500 MG: at 11:23

## 2024-12-27 ASSESSMENT — ACTIVITIES OF DAILY LIVING (ADL)
ADLS_ACUITY_SCORE: 69
ADLS_ACUITY_SCORE: 69
ADLS_ACUITY_SCORE: 73
ADLS_ACUITY_SCORE: 59
ADLS_ACUITY_SCORE: 73
ADLS_ACUITY_SCORE: 69
ADLS_ACUITY_SCORE: 73
ADLS_ACUITY_SCORE: 69
ADLS_ACUITY_SCORE: 73
ADLS_ACUITY_SCORE: 69
ADLS_ACUITY_SCORE: 59
ADLS_ACUITY_SCORE: 69
ADLS_ACUITY_SCORE: 59
ADLS_ACUITY_SCORE: 61
ADLS_ACUITY_SCORE: 61
ADLS_ACUITY_SCORE: 69
ADLS_ACUITY_SCORE: 59
ADLS_ACUITY_SCORE: 59
ADLS_ACUITY_SCORE: 73
ADLS_ACUITY_SCORE: 69
ADLS_ACUITY_SCORE: 59
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
## 2024-12-27 NOTE — PLAN OF CARE
ALLOCATION NOTE               [facility] Note:   Allocation   Organs intended for Donation:  Heart, Lungs, Liver, Kidneys, and Pancreas  OR Estimate:  12/28  [facility] WILL be onsite

## 2024-12-27 NOTE — PHARMACY-ADMISSION MEDICATION HISTORY
Pharmacist Admission Medication History    [facility]      Medication History Completed By: Carol Cody RPH 12/26/2024 8:04 PM    No outpatient medications have been marked as taking for the 12/26/24 encounter (Hospital Encounter).

## 2024-12-27 NOTE — H&P
[facility] - Intensive Care Unit Admission Note     This organ donor is admitted to the Surgical Intensive Care Unit of [facility] under my care.     This donor has been declared  after brain death at an outside facility by qualified medical providers, and authorized consent for donation of organs and tissues has occurred prior to transfer. These organ donors have been transferred to our facility to provide opportunity to augment medical care as a means of optimizing the utilization of organs and tissues for donation to benefit those in need.     Medical care will proceed based on previously established protocols of the local Organ Procurement Organization, [facility]. Care delivery will be directed by members of the [facility] team with possible consultation of ancillary services based at the [facility].      Discussed with Staff, Dr. Cooper.    Taylor Lynch MD   PGY-2 General Surgery   Pager [phone]  [place]

## 2024-12-27 NOTE — PHARMACY-VANCOMYCIN DOSING SERVICE
"Pharmacy Vancomycin Initial Note  Date of Service 2024  Patient's  1990  34 year old, male    Indication:  lower respiratory infection    Current estimated CrCl = CrCl cannot be calculated (No successful lab value found.).    Creatinine for last 3 days  No results found for requested labs within last 3 days.    Recent Vancomycin Level(s) for last 3 days  No results found for requested labs within last 3 days.      Vancomycin IV Administrations (past 72 hours)        No vancomycin orders with administrations in past 72 hours.                    Nephrotoxins and other renal medications (From now, onward)      Start     Dose/Rate Route Frequency Ordered Stop    24 2300  vancomycin (VANCOCIN) 1,500 mg in 0.9% NaCl 250 mL intermittent infusion         1,500 mg  166.7 mL/hr over 90 Minutes Intravenous EVERY 12 HOURS 24 1903      24 2200  piperacillin-tazobactam (ZOSYN) intermittent infusion 4.5 g        Note to Pharmacy: For [facility], [facility] and [facility]: For Zosyn-naive patients, use the \"Zosyn initial dose + extended infusion\" order panel.    4.5 g  200 mL/hr over 30 Minutes Intravenous EVERY 6 HOURS 24 18424 1900  norepinephrine (LEVOPHED) 16 mg in  mL infusion MAX CONC CENTRAL LINE         0.01-0.6 mcg/kg/min × 114.3 kg (Dosing Weight)  1.1-64.3 mL/hr  Intravenous CONTINUOUS 24 1847      24 1900  vasopressin (VASOSTRICT) 20 Units in sodium chloride 0.9 % 100 mL standard conc infusion         0.2-6 Units/hr  1-30 mL/hr  Intravenous CONTINUOUS 24 1847              Contrast Orders - past 72 hours (72h ago, onward)      None            InsightRX Prediction of Planned Initial Vancomycin Regimen  Regimen: 1500 mg IV every 12 hours.  Start time: 22:42 on 2024  Exposure target: AUC24 (range)400-600 mg/L.hr   AUC24,ss: 547 mg/L.hr  Probability of AUC24 > 400: 81 %  Ctrough,ss: 17.5 mg/L  Probability of Ctrough,ss > 20: 39 %    Plan:  Patient received " Vancomycin 1500 mg iv x 1 today at 1042 @ OSH.  Plan to continue therapy at Vancomycin 1500 mg iv q12  Vancomycin monitoring method: AUC  Vancomycin therapeutic monitoring goal: 400-600 mg*h/L  Pharmacy will check vancomycin levels as appropriate in 1-3 Days.    Serum creatinine levels will be ordered daily for the first week of therapy and at least twice weekly for subsequent weeks.      Carol Cody, SaludD

## 2024-12-27 NOTE — PLAN OF CARE
ICU End of Shift Summary. See flowsheets for vital signs and detailed assessment.    Changes this shift: VSS. SR 70's. Weaned off Phenyl. Map >60. SpO2 high 90's to 100%. Recruitment maneuvers done per RT. Vaso infusing per [facility]'s directions. UOP 20-50/hr. CVP 12. 1U RBC's given. 20 Meq KCL given.     Plan: Organ allocation in process. Recruitment maneuvers continue.         Goal Outcome Evaluation:      Plan of Care Reviewed With: patient    Overall Patient Progress: no changeOverall Patient Progress: no change    Outcome Evaluation: Continueing with [facility] plan of care

## 2024-12-28 ENCOUNTER — APPOINTMENT (OUTPATIENT)
Dept: GENERAL RADIOLOGY | Facility: CLINIC | Age: 34
DRG: 407 | End: 2024-12-28
Payer: COMMERCIAL

## 2024-12-28 ENCOUNTER — ANESTHESIA (OUTPATIENT)
Dept: SURGERY | Facility: CLINIC | Age: 34
DRG: 407 | End: 2024-12-28
Payer: COMMERCIAL

## 2024-12-28 VITALS
OXYGEN SATURATION: 100 % | DIASTOLIC BLOOD PRESSURE: 62 MMHG | BODY MASS INDEX: 35.79 KG/M2 | SYSTOLIC BLOOD PRESSURE: 110 MMHG | TEMPERATURE: 98.8 F | HEART RATE: 74 BPM | HEIGHT: 73 IN | WEIGHT: 270.06 LBS | RESPIRATION RATE: 12 BRPM

## 2024-12-28 LAB
ALBUMIN SERPL BCG-MCNC: 3 G/DL (ref 3.5–5.2)
ALBUMIN SERPL BCG-MCNC: 3.1 G/DL (ref 3.5–5.2)
ALBUMIN SERPL BCG-MCNC: 3.2 G/DL (ref 3.5–5.2)
ALBUMIN SERPL BCG-MCNC: 3.2 G/DL (ref 3.5–5.2)
ALBUMIN UR-MCNC: 30 MG/DL
ALLEN'S TEST: ABNORMAL
ALLEN'S TEST: YES
ALP SERPL-CCNC: 66 U/L (ref 40–150)
ALP SERPL-CCNC: 72 U/L (ref 40–150)
ALP SERPL-CCNC: 72 U/L (ref 40–150)
ALP SERPL-CCNC: 74 U/L (ref 40–150)
ALT SERPL W P-5'-P-CCNC: 14 U/L (ref 0–70)
ALT SERPL W P-5'-P-CCNC: 15 U/L (ref 0–70)
ALT SERPL W P-5'-P-CCNC: 17 U/L (ref 0–70)
ALT SERPL W P-5'-P-CCNC: 18 U/L (ref 0–70)
AMYLASE SERPL-CCNC: 103 U/L (ref 28–100)
AMYLASE SERPL-CCNC: 85 U/L (ref 28–100)
AMYLASE SERPL-CCNC: 85 U/L (ref 28–100)
AMYLASE SERPL-CCNC: 93 U/L (ref 28–100)
ANION GAP SERPL CALCULATED.3IONS-SCNC: 7 MMOL/L (ref 7–15)
ANION GAP SERPL CALCULATED.3IONS-SCNC: 7 MMOL/L (ref 7–15)
ANION GAP SERPL CALCULATED.3IONS-SCNC: 8 MMOL/L (ref 7–15)
ANION GAP SERPL CALCULATED.3IONS-SCNC: 9 MMOL/L (ref 7–15)
APPEARANCE UR: CLEAR
APTT PPP: 22 SECONDS (ref 22–38)
APTT PPP: 26 SECONDS (ref 22–38)
APTT PPP: 27 SECONDS (ref 22–38)
APTT PPP: 30 SECONDS (ref 22–38)
AST SERPL W P-5'-P-CCNC: 53 U/L (ref 0–45)
AST SERPL W P-5'-P-CCNC: 56 U/L (ref 0–45)
AST SERPL W P-5'-P-CCNC: 60 U/L (ref 0–45)
AST SERPL W P-5'-P-CCNC: 60 U/L (ref 0–45)
BACTERIA SPT CULT: ABNORMAL
BASE EXCESS BLDA CALC-SCNC: 4.5 MMOL/L (ref -3–3)
BASE EXCESS BLDA CALC-SCNC: 6 MMOL/L (ref -3–3)
BASE EXCESS BLDA CALC-SCNC: 6 MMOL/L (ref -3–3)
BASE EXCESS BLDA CALC-SCNC: 7.4 MMOL/L (ref -3–3)
BASE EXCESS BLDA CALC-SCNC: 7.4 MMOL/L (ref -3–3)
BASE EXCESS BLDA CALC-SCNC: 8.2 MMOL/L (ref -3–3)
BASOPHILS # BLD AUTO: 0 10E3/UL (ref 0–0.2)
BASOPHILS NFR BLD AUTO: 0 %
BILIRUB DIRECT SERPL-MCNC: 0.21 MG/DL (ref 0–0.3)
BILIRUB DIRECT SERPL-MCNC: 0.23 MG/DL (ref 0–0.3)
BILIRUB DIRECT SERPL-MCNC: 0.25 MG/DL (ref 0–0.3)
BILIRUB DIRECT SERPL-MCNC: 0.27 MG/DL (ref 0–0.3)
BILIRUB SERPL-MCNC: 0.5 MG/DL
BILIRUB UR QL STRIP: NEGATIVE
BUN SERPL-MCNC: 10 MG/DL (ref 6–20)
BUN SERPL-MCNC: 11.6 MG/DL (ref 6–20)
BUN SERPL-MCNC: 12.8 MG/DL (ref 6–20)
BUN SERPL-MCNC: 13.8 MG/DL (ref 6–20)
CA-I BLD-MCNC: 4.5 MG/DL (ref 4.4–5.2)
CA-I BLD-MCNC: 4.6 MG/DL (ref 4.4–5.2)
CA-I BLD-MCNC: 4.7 MG/DL (ref 4.4–5.2)
CA-I BLD-MCNC: 4.7 MG/DL (ref 4.4–5.2)
CALCIUM SERPL-MCNC: 8.3 MG/DL (ref 8.8–10.4)
CALCIUM SERPL-MCNC: 8.4 MG/DL (ref 8.8–10.4)
CALCIUM SERPL-MCNC: 8.5 MG/DL (ref 8.8–10.4)
CALCIUM SERPL-MCNC: 8.7 MG/DL (ref 8.8–10.4)
CHLORIDE SERPL-SCNC: 113 MMOL/L (ref 98–107)
CHLORIDE SERPL-SCNC: 116 MMOL/L (ref 98–107)
CHLORIDE SERPL-SCNC: 116 MMOL/L (ref 98–107)
CHLORIDE SERPL-SCNC: 120 MMOL/L (ref 98–107)
CK SERPL-CCNC: 345 U/L (ref 39–308)
CK SERPL-CCNC: 375 U/L (ref 39–308)
CK SERPL-CCNC: 412 U/L (ref 39–308)
CK SERPL-CCNC: 457 U/L (ref 39–308)
COLOR UR AUTO: YELLOW
CREAT SERPL-MCNC: 0.63 MG/DL (ref 0.67–1.17)
CREAT SERPL-MCNC: 0.67 MG/DL (ref 0.67–1.17)
CREAT SERPL-MCNC: 0.69 MG/DL (ref 0.67–1.17)
CREAT SERPL-MCNC: 0.75 MG/DL (ref 0.67–1.17)
EGFRCR SERPLBLD CKD-EPI 2021: >90 ML/MIN/1.73M2
EOSINOPHIL # BLD AUTO: 0 10E3/UL (ref 0–0.7)
EOSINOPHIL NFR BLD AUTO: 0 %
ERYTHROCYTE [DISTWIDTH] IN BLOOD BY AUTOMATED COUNT: 16.2 % (ref 10–15)
ERYTHROCYTE [DISTWIDTH] IN BLOOD BY AUTOMATED COUNT: 16.4 % (ref 10–15)
ERYTHROCYTE [DISTWIDTH] IN BLOOD BY AUTOMATED COUNT: 16.6 % (ref 10–15)
ERYTHROCYTE [DISTWIDTH] IN BLOOD BY AUTOMATED COUNT: 16.6 % (ref 10–15)
FIBRINOGEN PPP-MCNC: 715 MG/DL (ref 170–510)
FIBRINOGEN PPP-MCNC: 731 MG/DL (ref 170–510)
FIBRINOGEN PPP-MCNC: 748 MG/DL (ref 170–510)
FIBRINOGEN PPP-MCNC: 761 MG/DL (ref 170–510)
GGT SERPL-CCNC: 116 U/L (ref 8–61)
GGT SERPL-CCNC: 136 U/L (ref 8–61)
GGT SERPL-CCNC: 155 U/L (ref 8–61)
GGT SERPL-CCNC: 158 U/L (ref 8–61)
GLUCOSE BLD-MCNC: 118 MG/DL (ref 70–99)
GLUCOSE BLD-MCNC: 123 MG/DL (ref 70–99)
GLUCOSE BLDC GLUCOMTR-MCNC: 108 MG/DL (ref 70–99)
GLUCOSE BLDC GLUCOMTR-MCNC: 109 MG/DL (ref 70–99)
GLUCOSE BLDC GLUCOMTR-MCNC: 112 MG/DL (ref 70–99)
GLUCOSE SERPL-MCNC: 122 MG/DL (ref 70–99)
GLUCOSE SERPL-MCNC: 131 MG/DL (ref 70–99)
GLUCOSE SERPL-MCNC: 131 MG/DL (ref 70–99)
GLUCOSE SERPL-MCNC: 146 MG/DL (ref 70–99)
GLUCOSE UR STRIP-MCNC: NEGATIVE MG/DL
GRAM STAIN RESULT: ABNORMAL
GRAM STAIN RESULT: ABNORMAL
HCO3 BLD-SCNC: 29 MMOL/L (ref 21–28)
HCO3 BLD-SCNC: 30 MMOL/L (ref 21–28)
HCO3 BLD-SCNC: 30 MMOL/L (ref 21–28)
HCO3 BLD-SCNC: 31 MMOL/L (ref 21–28)
HCO3 BLDA-SCNC: 32 MMOL/L (ref 21–28)
HCO3 BLDA-SCNC: 33 MMOL/L (ref 21–28)
HCO3 SERPL-SCNC: 27 MMOL/L (ref 22–29)
HCO3 SERPL-SCNC: 28 MMOL/L (ref 22–29)
HCO3 SERPL-SCNC: 28 MMOL/L (ref 22–29)
HCO3 SERPL-SCNC: 29 MMOL/L (ref 22–29)
HCT VFR BLD AUTO: 20.6 % (ref 40–53)
HCT VFR BLD AUTO: 22.3 % (ref 40–53)
HCT VFR BLD AUTO: 22.3 % (ref 40–53)
HCT VFR BLD AUTO: 23.2 % (ref 40–53)
HGB BLD-MCNC: 7.1 G/DL (ref 13.3–17.7)
HGB BLD-MCNC: 7.5 G/DL (ref 13.3–17.7)
HGB BLD-MCNC: 7.5 G/DL (ref 13.3–17.7)
HGB BLD-MCNC: 7.7 G/DL (ref 13.3–17.7)
HGB BLD-MCNC: 8 G/DL (ref 13.3–17.7)
HGB BLD-MCNC: 8.7 G/DL (ref 13.3–17.7)
HGB UR QL STRIP: NEGATIVE
IMM GRANULOCYTES # BLD: 0 10E3/UL
IMM GRANULOCYTES # BLD: 0.1 10E3/UL
IMM GRANULOCYTES NFR BLD: 1 %
INR PPP: 1.12 (ref 0.85–1.15)
INR PPP: 1.18 (ref 0.85–1.15)
INR PPP: 1.19 (ref 0.85–1.15)
INR PPP: 1.23 (ref 0.85–1.15)
KETONES UR STRIP-MCNC: NEGATIVE MG/DL
LACTATE BLD-SCNC: 0.7 MMOL/L (ref 0.7–2)
LACTATE BLD-SCNC: 0.9 MMOL/L (ref 0.7–2)
LACTATE SERPL-SCNC: 0.8 MMOL/L (ref 0.7–2)
LACTATE SERPL-SCNC: 0.9 MMOL/L (ref 0.7–2)
LDH SERPL L TO P-CCNC: 272 U/L (ref 0–250)
LDH SERPL L TO P-CCNC: 308 U/L (ref 0–250)
LDH SERPL L TO P-CCNC: 316 U/L (ref 0–250)
LDH SERPL L TO P-CCNC: 324 U/L (ref 0–250)
LEUKOCYTE ESTERASE UR QL STRIP: NEGATIVE
LIPASE SERPL-CCNC: 157 U/L (ref 13–60)
LIPASE SERPL-CCNC: 162 U/L (ref 13–60)
LIPASE SERPL-CCNC: 192 U/L (ref 13–60)
LIPASE SERPL-CCNC: 197 U/L (ref 13–60)
LYMPHOCYTES # BLD AUTO: 0.8 10E3/UL (ref 0.8–5.3)
LYMPHOCYTES # BLD AUTO: 1.1 10E3/UL (ref 0.8–5.3)
LYMPHOCYTES NFR BLD AUTO: 14 %
LYMPHOCYTES NFR BLD AUTO: 8 %
LYMPHOCYTES NFR BLD AUTO: 9 %
LYMPHOCYTES NFR BLD AUTO: 9 %
MAGNESIUM SERPL-MCNC: 2.3 MG/DL (ref 1.7–2.3)
MAGNESIUM SERPL-MCNC: 2.4 MG/DL (ref 1.7–2.3)
MAGNESIUM SERPL-MCNC: 2.4 MG/DL (ref 1.7–2.3)
MAGNESIUM SERPL-MCNC: 2.5 MG/DL (ref 1.7–2.3)
MCH RBC QN AUTO: 30.5 PG (ref 26.5–33)
MCH RBC QN AUTO: 31.5 PG (ref 26.5–33)
MCH RBC QN AUTO: 31.8 PG (ref 26.5–33)
MCH RBC QN AUTO: 32 PG (ref 26.5–33)
MCHC RBC AUTO-ENTMCNC: 32.3 G/DL (ref 31.5–36.5)
MCHC RBC AUTO-ENTMCNC: 33.6 G/DL (ref 31.5–36.5)
MCHC RBC AUTO-ENTMCNC: 34.5 G/DL (ref 31.5–36.5)
MCHC RBC AUTO-ENTMCNC: 34.5 G/DL (ref 31.5–36.5)
MCV RBC AUTO: 92 FL (ref 78–100)
MCV RBC AUTO: 93 FL (ref 78–100)
MCV RBC AUTO: 94 FL (ref 78–100)
MCV RBC AUTO: 94 FL (ref 78–100)
MONOCYTES # BLD AUTO: 0.5 10E3/UL (ref 0–1.3)
MONOCYTES # BLD AUTO: 0.6 10E3/UL (ref 0–1.3)
MONOCYTES # BLD AUTO: 0.6 10E3/UL (ref 0–1.3)
MONOCYTES # BLD AUTO: 0.7 10E3/UL (ref 0–1.3)
MONOCYTES NFR BLD AUTO: 7 %
MONOCYTES NFR BLD AUTO: 7 %
MONOCYTES NFR BLD AUTO: 8 %
MONOCYTES NFR BLD AUTO: 8 %
MUCOUS THREADS #/AREA URNS LPF: PRESENT /LPF
NEUTROPHILS # BLD AUTO: 6.1 10E3/UL (ref 1.6–8.3)
NEUTROPHILS # BLD AUTO: 6.8 10E3/UL (ref 1.6–8.3)
NEUTROPHILS # BLD AUTO: 7.5 10E3/UL (ref 1.6–8.3)
NEUTROPHILS # BLD AUTO: 7.8 10E3/UL (ref 1.6–8.3)
NEUTROPHILS NFR BLD AUTO: 77 %
NEUTROPHILS NFR BLD AUTO: 83 %
NITRATE UR QL: NEGATIVE
NRBC # BLD AUTO: 0 10E3/UL
NRBC BLD AUTO-RTO: 0 /100
O2/TOTAL GAS SETTING VFR VENT: 100 %
OXYHGB MFR BLDA: 100 % (ref 92–100)
OXYHGB MFR BLDA: 99 % (ref 92–100)
PCO2 BLD: 39 MM HG (ref 35–45)
PCO2 BLD: 39 MM HG (ref 35–45)
PCO2 BLD: 40 MM HG (ref 35–45)
PCO2 BLD: 41 MM HG (ref 35–45)
PCO2 BLDA: 43 MM HG (ref 35–45)
PCO2 BLDA: 44 MM HG (ref 35–45)
PEEP: 5 CM H2O
PH BLD: 7.47 [PH] (ref 7.35–7.45)
PH BLD: 7.49 [PH] (ref 7.35–7.45)
PH BLDA: 7.48 [PH] (ref 7.35–7.45)
PH BLDA: 7.48 [PH] (ref 7.35–7.45)
PH UR STRIP: 6.5 [PH] (ref 5–7)
PHOSPHATE SERPL-MCNC: 1.8 MG/DL (ref 2.5–4.5)
PHOSPHATE SERPL-MCNC: 3.2 MG/DL (ref 2.5–4.5)
PHOSPHATE SERPL-MCNC: 4 MG/DL (ref 2.5–4.5)
PHOSPHATE SERPL-MCNC: 4.5 MG/DL (ref 2.5–4.5)
PLATELET # BLD AUTO: 156 10E3/UL (ref 150–450)
PLATELET # BLD AUTO: 164 10E3/UL (ref 150–450)
PLATELET # BLD AUTO: 166 10E3/UL (ref 150–450)
PLATELET # BLD AUTO: 176 10E3/UL (ref 150–450)
PO2 BLD: 178 MM HG (ref 80–105)
PO2 BLD: 337 MM HG (ref 80–105)
PO2 BLD: 478 MM HG (ref 80–105)
PO2 BLD: 508 MM HG (ref 80–105)
PO2 BLDA: 391 MM HG (ref 80–105)
PO2 BLDA: 549 MM HG (ref 80–105)
POTASSIUM BLD-SCNC: 3.6 MMOL/L (ref 3.4–5.3)
POTASSIUM BLD-SCNC: 4 MMOL/L (ref 3.4–5.3)
POTASSIUM SERPL-SCNC: 3.3 MMOL/L (ref 3.4–5.3)
POTASSIUM SERPL-SCNC: 3.7 MMOL/L (ref 3.4–5.3)
POTASSIUM SERPL-SCNC: 3.8 MMOL/L (ref 3.4–5.3)
POTASSIUM SERPL-SCNC: 3.9 MMOL/L (ref 3.4–5.3)
PROT SERPL-MCNC: 5.5 G/DL (ref 6.4–8.3)
PROT SERPL-MCNC: 5.6 G/DL (ref 6.4–8.3)
PROT SERPL-MCNC: 5.7 G/DL (ref 6.4–8.3)
PROT SERPL-MCNC: 5.8 G/DL (ref 6.4–8.3)
RBC # BLD AUTO: 2.22 10E6/UL (ref 4.4–5.9)
RBC # BLD AUTO: 2.38 10E6/UL (ref 4.4–5.9)
RBC # BLD AUTO: 2.42 10E6/UL (ref 4.4–5.9)
RBC # BLD AUTO: 2.46 10E6/UL (ref 4.4–5.9)
RBC URINE: 1 /HPF
SAO2 % BLDA: 100 % (ref 96–97)
SAO2 % BLDA: 100 % (ref 96–97)
SAO2 % BLDA: >100 % (ref 96–97)
SODIUM BLD-SCNC: 158 MMOL/L (ref 135–145)
SODIUM BLD-SCNC: 159 MMOL/L (ref 135–145)
SODIUM SERPL-SCNC: 149 MMOL/L (ref 135–145)
SODIUM SERPL-SCNC: 151 MMOL/L (ref 135–145)
SODIUM SERPL-SCNC: 153 MMOL/L (ref 135–145)
SODIUM SERPL-SCNC: 155 MMOL/L (ref 135–145)
SP GR UR STRIP: 1.02 (ref 1–1.03)
TROPONIN T SERPL HS-MCNC: 10 NG/L
TROPONIN T SERPL HS-MCNC: 11 NG/L
TROPONIN T SERPL HS-MCNC: 11 NG/L
TROPONIN T SERPL HS-MCNC: 12 NG/L
UROBILINOGEN UR STRIP-MCNC: NORMAL MG/DL
VANCOMYCIN SERPL-MCNC: 4.8 UG/ML
WBC # BLD AUTO: 7.9 10E3/UL (ref 4–11)
WBC # BLD AUTO: 8.2 10E3/UL (ref 4–11)
WBC # BLD AUTO: 9 10E3/UL (ref 4–11)
WBC # BLD AUTO: 9.3 10E3/UL (ref 4–11)
WBC URINE: <1 /HPF

## 2024-12-28 PROCEDURE — 71045 X-RAY EXAM CHEST 1 VIEW: CPT | Mod: 26 | Performed by: RADIOLOGY

## 2024-12-28 PROCEDURE — 84450 TRANSFERASE (AST) (SGOT): CPT

## 2024-12-28 PROCEDURE — 84155 ASSAY OF PROTEIN SERUM: CPT

## 2024-12-28 PROCEDURE — 258N000003 HC RX IP 258 OP 636

## 2024-12-28 PROCEDURE — 82977 ASSAY OF GGT: CPT

## 2024-12-28 PROCEDURE — 83605 ASSAY OF LACTIC ACID: CPT

## 2024-12-28 PROCEDURE — 250N000011 HC RX IP 250 OP 636: Performed by: SURGERY

## 2024-12-28 PROCEDURE — 258N000003 HC RX IP 258 OP 636: Performed by: STUDENT IN AN ORGANIZED HEALTH CARE EDUCATION/TRAINING PROGRAM

## 2024-12-28 PROCEDURE — 85384 FIBRINOGEN ACTIVITY: CPT

## 2024-12-28 PROCEDURE — 370N000017 HC ANESTHESIA TECHNICAL FEE, PER MIN

## 2024-12-28 PROCEDURE — 81001 URINALYSIS AUTO W/SCOPE: CPT

## 2024-12-28 PROCEDURE — 84100 ASSAY OF PHOSPHORUS: CPT

## 2024-12-28 PROCEDURE — 85025 COMPLETE CBC W/AUTO DIFF WBC: CPT

## 2024-12-28 PROCEDURE — 80053 COMPREHEN METABOLIC PANEL: CPT

## 2024-12-28 PROCEDURE — 84460 ALANINE AMINO (ALT) (SGPT): CPT

## 2024-12-28 PROCEDURE — 84484 ASSAY OF TROPONIN QUANT: CPT

## 2024-12-28 PROCEDURE — 83735 ASSAY OF MAGNESIUM: CPT

## 2024-12-28 PROCEDURE — 82310 ASSAY OF CALCIUM: CPT

## 2024-12-28 PROCEDURE — 0TT10ZZ RESECTION OF LEFT KIDNEY, OPEN APPROACH: ICD-10-PCS | Performed by: SURGERY

## 2024-12-28 PROCEDURE — 85730 THROMBOPLASTIN TIME PARTIAL: CPT

## 2024-12-28 PROCEDURE — 82330 ASSAY OF CALCIUM: CPT

## 2024-12-28 PROCEDURE — 258N000003 HC RX IP 258 OP 636: Performed by: SURGERY

## 2024-12-28 PROCEDURE — 82248 BILIRUBIN DIRECT: CPT

## 2024-12-28 PROCEDURE — 84075 ASSAY ALKALINE PHOSPHATASE: CPT

## 2024-12-28 PROCEDURE — 94640 AIRWAY INHALATION TREATMENT: CPT | Mod: 76

## 2024-12-28 PROCEDURE — 85610 PROTHROMBIN TIME: CPT

## 2024-12-28 PROCEDURE — 83690 ASSAY OF LIPASE: CPT

## 2024-12-28 PROCEDURE — 272N000001 HC OR GENERAL SUPPLY STERILE

## 2024-12-28 PROCEDURE — 82550 ASSAY OF CK (CPK): CPT

## 2024-12-28 PROCEDURE — 250N000009 HC RX 250

## 2024-12-28 PROCEDURE — 71045 X-RAY EXAM CHEST 1 VIEW: CPT

## 2024-12-28 PROCEDURE — 83615 LACTATE (LD) (LDH) ENZYME: CPT

## 2024-12-28 PROCEDURE — 82150 ASSAY OF AMYLASE: CPT

## 2024-12-28 PROCEDURE — 82805 BLOOD GASES W/O2 SATURATION: CPT

## 2024-12-28 PROCEDURE — 85018 HEMOGLOBIN: CPT

## 2024-12-28 PROCEDURE — 80048 BASIC METABOLIC PNL TOTAL CA: CPT

## 2024-12-28 PROCEDURE — 999N000157 HC STATISTIC RCP TIME EA 10 MIN

## 2024-12-28 PROCEDURE — 0TT00ZZ RESECTION OF RIGHT KIDNEY, OPEN APPROACH: ICD-10-PCS | Performed by: SURGERY

## 2024-12-28 PROCEDURE — 250N000025 HC SEVOFLURANE, PER MIN

## 2024-12-28 PROCEDURE — 80202 ASSAY OF VANCOMYCIN: CPT | Performed by: SURGERY

## 2024-12-28 PROCEDURE — 80069 RENAL FUNCTION PANEL: CPT

## 2024-12-28 PROCEDURE — 0FT00ZZ RESECTION OF LIVER, OPEN APPROACH: ICD-10-PCS | Performed by: SURGERY

## 2024-12-28 PROCEDURE — 84132 ASSAY OF SERUM POTASSIUM: CPT

## 2024-12-28 PROCEDURE — 71045 X-RAY EXAM CHEST 1 VIEW: CPT | Mod: 76

## 2024-12-28 PROCEDURE — 82247 BILIRUBIN TOTAL: CPT

## 2024-12-28 PROCEDURE — 85048 AUTOMATED LEUKOCYTE COUNT: CPT

## 2024-12-28 PROCEDURE — 82040 ASSAY OF SERUM ALBUMIN: CPT

## 2024-12-28 PROCEDURE — 250N000011 HC RX IP 250 OP 636: Performed by: STUDENT IN AN ORGANIZED HEALTH CARE EDUCATION/TRAINING PROGRAM

## 2024-12-28 PROCEDURE — 360N000079 HC SURGERY LEVEL 6, PER MIN

## 2024-12-28 PROCEDURE — 94799 UNLISTED PULMONARY SVC/PX: CPT

## 2024-12-28 PROCEDURE — 200N000002 HC R&B ICU UMMC

## 2024-12-28 PROCEDURE — 36620 INSERTION CATHETER ARTERY: CPT | Mod: GC | Performed by: SURGERY

## 2024-12-28 PROCEDURE — 250N000009 HC RX 250: Performed by: STUDENT IN AN ORGANIZED HEALTH CARE EDUCATION/TRAINING PROGRAM

## 2024-12-28 PROCEDURE — 80051 ELECTROLYTE PANEL: CPT

## 2024-12-28 PROCEDURE — 250N000011 HC RX IP 250 OP 636

## 2024-12-28 RX ORDER — HYDROCORTISONE SODIUM SUCCINATE 100 MG/2ML
100 INJECTION INTRAMUSCULAR; INTRAVENOUS EVERY 8 HOURS
Status: DISCONTINUED | OUTPATIENT
Start: 2024-12-28 | End: 2024-12-29 | Stop reason: HOSPADM

## 2024-12-28 RX ORDER — VASOPRESSIN IN 0.9 % NACL 2 UNIT/2ML
SYRINGE (ML) INTRAVENOUS PRN
Status: DISCONTINUED | OUTPATIENT
Start: 2024-12-28 | End: 2024-12-28

## 2024-12-28 RX ORDER — HEPARIN SODIUM 1000 [USP'U]/ML
INJECTION, SOLUTION INTRAVENOUS; SUBCUTANEOUS PRN
Status: DISCONTINUED | OUTPATIENT
Start: 2024-12-28 | End: 2024-12-28

## 2024-12-28 RX ORDER — POTASSIUM CHLORIDE 29.8 MG/ML
20 INJECTION INTRAVENOUS
Status: DISPENSED | OUTPATIENT
Start: 2024-12-28 | End: 2024-12-28

## 2024-12-28 RX ORDER — POTASSIUM CHLORIDE 29.8 MG/ML
20 INJECTION INTRAVENOUS ONCE
Status: COMPLETED | OUTPATIENT
Start: 2024-12-28 | End: 2024-12-28

## 2024-12-28 RX ORDER — SODIUM CHLORIDE, SODIUM LACTATE, POTASSIUM CHLORIDE, CALCIUM CHLORIDE 600; 310; 30; 20 MG/100ML; MG/100ML; MG/100ML; MG/100ML
INJECTION, SOLUTION INTRAVENOUS CONTINUOUS PRN
Status: DISCONTINUED | OUTPATIENT
Start: 2024-12-28 | End: 2024-12-28

## 2024-12-28 RX ORDER — DEXTROSE MONOHYDRATE 50 MG/ML
INJECTION, SOLUTION INTRAVENOUS CONTINUOUS
Status: DISCONTINUED | OUTPATIENT
Start: 2024-12-28 | End: 2024-12-29 | Stop reason: HOSPADM

## 2024-12-28 RX ORDER — HEPARIN SODIUM 10000 [USP'U]/ML
35000 INJECTION, SOLUTION INTRAVENOUS; SUBCUTANEOUS ONCE
Status: DISCONTINUED | OUTPATIENT
Start: 2024-12-28 | End: 2024-12-29 | Stop reason: HOSPADM

## 2024-12-28 RX ORDER — POTASSIUM CHLORIDE 29.8 MG/ML
20 INJECTION INTRAVENOUS
Status: COMPLETED | OUTPATIENT
Start: 2024-12-28 | End: 2024-12-28

## 2024-12-28 RX ADMIN — PIPERACILLIN SODIUM AND TAZOBACTAM SODIUM 4.5 G: 4; .5 INJECTION, SOLUTION INTRAVENOUS at 17:21

## 2024-12-28 RX ADMIN — POTASSIUM CHLORIDE 20 MEQ: 29.8 INJECTION, SOLUTION INTRAVENOUS at 05:45

## 2024-12-28 RX ADMIN — Medication 1 UNITS: at 23:13

## 2024-12-28 RX ADMIN — HYDROCORTISONE SODIUM SUCCINATE 100 MG: 100 INJECTION, POWDER, FOR SOLUTION INTRAMUSCULAR; INTRAVENOUS at 04:43

## 2024-12-28 RX ADMIN — IPRATROPIUM BROMIDE AND ALBUTEROL SULFATE 3 ML: .5; 3 SOLUTION RESPIRATORY (INHALATION) at 08:04

## 2024-12-28 RX ADMIN — POTASSIUM CHLORIDE 20 MEQ: 29.8 INJECTION, SOLUTION INTRAVENOUS at 13:59

## 2024-12-28 RX ADMIN — DEXTROSE MONOHYDRATE: 50 INJECTION, SOLUTION INTRAVENOUS at 20:20

## 2024-12-28 RX ADMIN — POTASSIUM CHLORIDE 20 MEQ: 29.8 INJECTION INTRAVENOUS at 02:22

## 2024-12-28 RX ADMIN — POTASSIUM CHLORIDE 20 MEQ: 29.8 INJECTION, SOLUTION INTRAVENOUS at 17:58

## 2024-12-28 RX ADMIN — VANCOMYCIN HYDROCHLORIDE 1500 MG: 500 INJECTION, POWDER, LYOPHILIZED, FOR SOLUTION INTRAVENOUS at 22:58

## 2024-12-28 RX ADMIN — POTASSIUM CHLORIDE 20 MEQ: 29.8 INJECTION INTRAVENOUS at 00:37

## 2024-12-28 RX ADMIN — SODIUM CHLORIDE, POTASSIUM CHLORIDE, SODIUM LACTATE AND CALCIUM CHLORIDE: 600; 310; 30; 20 INJECTION, SOLUTION INTRAVENOUS at 22:23

## 2024-12-28 RX ADMIN — POTASSIUM CHLORIDE 20 MEQ: 29.8 INJECTION, SOLUTION INTRAVENOUS at 18:58

## 2024-12-28 RX ADMIN — POTASSIUM CHLORIDE 20 MEQ: 29.8 INJECTION INTRAVENOUS at 01:20

## 2024-12-28 RX ADMIN — POTASSIUM PHOSPHATE, MONOBASIC POTASSIUM PHOSPHATE, DIBASIC 30 MMOL: 224; 236 INJECTION, SOLUTION, CONCENTRATE INTRAVENOUS at 09:18

## 2024-12-28 RX ADMIN — NICARDIPINE HYDROCHLORIDE 5 MG/HR: 0.2 INJECTION, SOLUTION INTRAVENOUS at 22:55

## 2024-12-28 RX ADMIN — HYDROCORTISONE SODIUM SUCCINATE 100 MG: 100 INJECTION, POWDER, FOR SOLUTION INTRAMUSCULAR; INTRAVENOUS at 12:08

## 2024-12-28 RX ADMIN — HYDROCORTISONE SODIUM SUCCINATE 100 MG: 100 INJECTION, POWDER, FOR SOLUTION INTRAMUSCULAR; INTRAVENOUS at 19:42

## 2024-12-28 RX ADMIN — VANCOMYCIN HYDROCHLORIDE 1500 MG: 500 INJECTION, POWDER, LYOPHILIZED, FOR SOLUTION INTRAVENOUS at 18:01

## 2024-12-28 RX ADMIN — POTASSIUM CHLORIDE 20 MEQ: 29.8 INJECTION, SOLUTION INTRAVENOUS at 08:22

## 2024-12-28 RX ADMIN — POTASSIUM CHLORIDE 20 MEQ: 29.8 INJECTION, SOLUTION INTRAVENOUS at 06:56

## 2024-12-28 RX ADMIN — IPRATROPIUM BROMIDE AND ALBUTEROL SULFATE 3 ML: .5; 3 SOLUTION RESPIRATORY (INHALATION) at 20:28

## 2024-12-28 RX ADMIN — IPRATROPIUM BROMIDE AND ALBUTEROL SULFATE 3 ML: .5; 3 SOLUTION RESPIRATORY (INHALATION) at 12:30

## 2024-12-28 RX ADMIN — MICAFUNGIN 100 MG: 20 INJECTION, POWDER, LYOPHILIZED, FOR SOLUTION INTRAVENOUS at 15:20

## 2024-12-28 RX ADMIN — VANCOMYCIN HYDROCHLORIDE 1500 MG: 500 INJECTION, POWDER, LYOPHILIZED, FOR SOLUTION INTRAVENOUS at 10:41

## 2024-12-28 RX ADMIN — POTASSIUM CHLORIDE 20 MEQ: 29.8 INJECTION, SOLUTION INTRAVENOUS at 12:49

## 2024-12-28 RX ADMIN — FLUCONAZOLE 1200 MG: 2 INJECTION, SOLUTION INTRAVENOUS at 01:21

## 2024-12-28 RX ADMIN — PIPERACILLIN SODIUM AND TAZOBACTAM SODIUM 4.5 G: 4; .5 INJECTION, SOLUTION INTRAVENOUS at 09:01

## 2024-12-28 RX ADMIN — DEXTROSE MONOHYDRATE: 50 INJECTION, SOLUTION INTRAVENOUS at 05:14

## 2024-12-28 RX ADMIN — DEXTROSE AND SODIUM CHLORIDE: 5; 450 INJECTION, SOLUTION INTRAVENOUS at 04:28

## 2024-12-28 RX ADMIN — IPRATROPIUM BROMIDE AND ALBUTEROL SULFATE 3 ML: .5; 3 SOLUTION RESPIRATORY (INHALATION) at 04:05

## 2024-12-28 RX ADMIN — HEPARIN SODIUM 37000 UNITS: 1000 INJECTION INTRAVENOUS; SUBCUTANEOUS at 23:32

## 2024-12-28 RX ADMIN — PIPERACILLIN SODIUM AND TAZOBACTAM SODIUM 4.5 G: 4; .5 INJECTION, SOLUTION INTRAVENOUS at 04:43

## 2024-12-28 RX ADMIN — IPRATROPIUM BROMIDE AND ALBUTEROL SULFATE 3 ML: .5; 3 SOLUTION RESPIRATORY (INHALATION) at 17:11

## 2024-12-28 RX ADMIN — PIPERACILLIN SODIUM AND TAZOBACTAM SODIUM 4.5 G: 4; .5 INJECTION, SOLUTION INTRAVENOUS at 21:09

## 2024-12-28 RX ADMIN — Medication 0.5 UNITS: at 23:22

## 2024-12-28 ASSESSMENT — ACTIVITIES OF DAILY LIVING (ADL)
ADLS_ACUITY_SCORE: 72
ADLS_ACUITY_SCORE: 69
ADLS_ACUITY_SCORE: 71
ADLS_ACUITY_SCORE: 65
ADLS_ACUITY_SCORE: 69
ADLS_ACUITY_SCORE: 65
ADLS_ACUITY_SCORE: 69
ADLS_ACUITY_SCORE: 71
ADLS_ACUITY_SCORE: 72
ADLS_ACUITY_SCORE: 73
ADLS_ACUITY_SCORE: 72
ADLS_ACUITY_SCORE: 69
ADLS_ACUITY_SCORE: 65
ADLS_ACUITY_SCORE: 69

## 2024-12-28 NOTE — PROCEDURES
[facility]    Arterial line placement    Date/Time: 12/28/2024 5:13 PM    Performed by: Bethany Lee MD  Authorized by: Re Cooper MD      UNIVERSAL PROTOCOL   Site Marked: NA  Prior Images Obtained and Reviewed:  NA  Required items: Required blood products, implants, devices and special equipment available    Patient identity confirmed:  Hospital-assigned identification number and arm band  Patient was reevaluated immediately before administering moderate or deep sedation or anesthesia  Confirmation Checklist:  Patient's identity using two indicators, relevant allergies, procedure was appropriate and matched the consent or emergent situation and correct equipment/implants were available  Universal Protocol: the Joint Commission Universal Protocol was followed    Indication:  multiple ABGs  Location:  Right brachial      PROCEDURE DETAILS      Seldinger technique: Seldinger technique used    Number of Attempts:  1  Post-procedure:  Line sutured and dressing applied  CMS: unchanged    PROCEDURE  Describe Procedure: Indication: brain death, organ donation, need for frequent ABGs and hemodynamic monitoring  Length of time physician/provider present for 1:1 monitoring during sedation: 20        Staff, Dr. Cooper, available throughout procedure.    Bethany Lee MD  SICU Fellow

## 2024-12-28 NOTE — PLAN OF CARE
ICU End of Shift Summary. See flowsheets for vital signs and detailed assessment.    Changes this shift: Care provided from [id number]  Patient proned at 1530. 80mEq KCL given. Changes made to the vent; increased TV to 620 and decreased RR to 12. OG to LIS. Head and ear dressings changed. Patient on and off Vaso for urine output. Patient remains on D5 1/2 NS @ 125mL/hr.    Plan: Continue to provide supportive cares per [facility] POC. Tentative OR 12/28 @ 2200    Goal Outcome Evaluation:    Plan of Care Reviewed With: patient, family    Overall Patient Progress: no change    Outcome Evaluation: Patient prone @ 1530. Continue providing supportive cares per [facility] POC.
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
## 2024-12-28 NOTE — PLAN OF CARE
[facility] Note:      OR Note     Organs intended for Donation:  Heart, lungs, liver, pancreas, and kidneys    OR:    2200    Coordinator will be onsite           Pre-Huddle time:  2130    Heparin Dose to be administered in OR:  36,750 units

## 2024-12-28 NOTE — PHARMACY-VANCOMYCIN DOSING SERVICE
"Pharmacy Vancomycin Note  Date of Service 2024  Patient's  1990   34 year old, male    Indication: Surgical Prophylaxis, respiratory tract infection  Day of Therapy: 2  Current vancomycin regimen:  1500 mg IV q12h  Current vancomycin monitoring method: AUC  Current vancomycin therapeutic monitoring goal: 400-600 mg*h/L    InsightRX Prediction of Current Vancomycin Regimen    Exposure target: AUC24 (range)400-600 mg/L.hr   AUC24,ss: 307 mg/L.hr  Probability of AUC24 > 400: 5 %  Ctrough,ss: 5.4 mg/L  Probability of Ctrough,ss > 20: 0 %  Probability of nephrotoxicity (Lodise CAREN ): 3 %      Current estimated CrCl = Estimated Creatinine Clearance: 193.8 mL/min (based on SCr of 0.75 mg/dL).    Creatinine for last 3 days  2024:  9:52 PM Creatinine 0.66 mg/dL  2024:  3:50 AM Creatinine 0.64 mg/dL; 10:12 AM Creatinine 0.65 mg/dL;  4:05 PM Creatinine 0.68 mg/dL; 10:00 PM Creatinine 0.70 mg/dL  2024:  4:01 AM Creatinine 0.63 mg/dL; 10:07 AM Creatinine 0.75 mg/dL    Recent Vancomycin Levels (past 3 days)  2024: 10:07 AM Vancomycin 4.8 ug/mL    Vancomycin IV Administrations (past 72 hours)                     vancomycin (VANCOCIN) 1,500 mg in D5W 290 mL intermittent infusion (mg) 1,500 mg New Bag 24 1041    vancomycin (VANCOCIN) 1,500 mg in 0.9% NaCl 250 mL intermittent infusion (mg) 1,500 mg New Bag 24 2318     1,500 mg New Bag  1123     1,500 mg New Bag 24 2307                    Nephrotoxins and other renal medications (From now, onward)      Start     Dose/Rate Route Frequency Ordered Stop    24 1900  vancomycin (VANCOCIN) 1,500 mg in D5W 290 mL intermittent infusion         1,500 mg  over 90 Minutes Intravenous EVERY 8 HOURS 24 1641      24 2200  piperacillin-tazobactam (ZOSYN) intermittent infusion 4.5 g        Note to Pharmacy: For [facility], [facility] and [facility]: For Zosyn-naive patients, use the \"Zosyn initial dose + extended infusion\" order panel.    " 4.5 g  200 mL/hr over 30 Minutes Intravenous EVERY 6 HOURS 12/26/24 1847 12/26/24 1900  norepinephrine (LEVOPHED) 16 mg in  mL infusion MAX CONC CENTRAL LINE         0.01-0.6 mcg/kg/min × 114.3 kg (Dosing Weight)  1.1-64.3 mL/hr  Intravenous CONTINUOUS 12/26/24 1847 12/26/24 1900  vasopressin (VASOSTRICT) 20 Units in sodium chloride 0.9 % 100 mL standard conc infusion         0.2-6 Units/hr  1-30 mL/hr  Intravenous CONTINUOUS 12/26/24 1847                 Contrast Orders - past 72 hours (72h ago, onward)      Start     Dose/Rate Route Frequency Stop    12/27/24 0030  perflutren diluted 1mL to 2mL with saline (OPTISON) diluted injection 6 mL         6 mL Intravenous ONCE 12/27/24 0020            Interpretation of levels and current regimen:  Vancomycin level is reflective of AUC less than 400    Has serum creatinine changed greater than 50% in last 72 hours: No    Urine output:  good urine output    Renal Function: Stable    InsightRX Prediction of Planned New Vancomycin Regimen    AUC24,ss: 460 mg/L.hr  Probability of AUC24 > 400: 80 %  Ctrough,ss: 10.5 mg/L  Probability of Ctrough,ss > 20: 0 %  Probability of nephrotoxicity (Lodise CAREN 2009): 6 %      Plan:  Increase to 1500mg IV vancomycin q8h  Vancomycin monitoring method: AUC  Vancomycin therapeutic monitoring goal: 400-600 mg*h/L  Pharmacy will check vancomycin levels as appropriate in 1-3 Days.  Serum creatinine levels will be ordered a minimum of twice weekly.    Shae Larson, PharmD, BCCCP       Discharged

## 2024-12-28 NOTE — PROGRESS NOTES
ICU End of Shift Summary. See flowsheets for vital signs and detailed assessment.    Changes this shift: VSS. SR 70's. Map > 65. SpO2 100%. Recruitment maneuvers done per RT. See serial ABG's. Vaso infusing. UOP slowing down. See I&O's. Na rising. FWF added. 60 meq KCL given NOC. Another 60 meq ordered for this AM.     Plan: optimization for OR tentatively @ 2200.

## 2024-12-29 ASSESSMENT — ACTIVITIES OF DAILY LIVING (ADL): ADLS_ACUITY_SCORE: 72
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
## 2024-12-29 NOTE — PROGRESS NOTES
End of Shift Summary (see flowsheet for detailed vital signs and assessments)    Neuro: Patient unresponsive with RASS -5.   CV: SR 70s with stable BP. On vaso gtt for UOP.  Resp: Pt on CMV vent settings 30%/ 12 RR/ 620 TV/ 10 PEEP and tolerating settings. Moderate amt tan creamy secretions via inline trach suctioning. Recruitment maneuvers done by RT.  GI: No known LBM. OG clamped.  : Lopez in place with 60-75ml/hr. Goal UOP 50-100ml/hr per [facility] coordinator.  Skin: See flowsheet  Lines: Art line/ 3 PIV/ TL CVC  Infusions: Vaso gtt at 0.2/ D5 gtt at 74ml/hr (Titrate D5 to keep IVG at 125ml/hr)      Plan: Care of plan continues. Notify team of changes/concerns.  - Last set labs at 2000  - OR at 2200 tonight   - [facility] numbers q1H and CVP q4H    Care of Plan Reviewed With: Patient    Overall Patient Progress: No change

## 2024-12-30 LAB
ATRIAL RATE - MUSE: 78 BPM
DIASTOLIC BLOOD PRESSURE - MUSE: NORMAL MMHG
INTERPRETATION ECG - MUSE: NORMAL
P AXIS - MUSE: 65 DEGREES
PR INTERVAL - MUSE: 136 MS
QRS DURATION - MUSE: 96 MS
QT - MUSE: 416 MS
QTC - MUSE: 474 MS
R AXIS - MUSE: 66 DEGREES
SYSTOLIC BLOOD PRESSURE - MUSE: NORMAL MMHG
T AXIS - MUSE: 50 DEGREES
VENTRICULAR RATE- MUSE: 78 BPM

## 2024-12-30 NOTE — ANESTHESIA POSTPROCEDURE EVALUATION
Patient: Les Sow    Procedure: Procedure(s):  Procure donor organ(s)       Anesthesia Type:  General    Note:  Disposition:Disposition: . organ procurement.   Postop Pain Control:    PONV:    Neuro/Psych:    Airway/Respiratory:    CV/Hemodynamics:    Other NRE:    DID A NON-ROUTINE EVENT OCCUR?            Last vitals:  Vitals:    24 2130 24 2145 24 2200   BP: 114/64 110/65 110/62   Pulse: 77 75 74   Resp: 12 12 12   Temp:   37.1  C (98.8  F)   SpO2: 100% 100% 100%       Electronically Signed By: Bryan Lema MD  2024  6:48 AM

## (undated) DEVICE — MINOR SINGLE BASIN KIT CSR WRAPX2 7QT SSK3002

## (undated) DEVICE — SUCTION MANIFOLD NEPTUNE 2 SYS 4 PORT 0702-020-000

## (undated) DEVICE — SU SILK 2-0 SH CR 8X18" C012D

## (undated) DEVICE — SUCTION TUBING

## (undated) DEVICE — DRAPE BACK TABLE  44X90" 8377

## (undated) DEVICE — SU PROLENE 4-0 RB-1DA 36" 8557H

## (undated) DEVICE — PREP CHLORAPREP 26ML TINTED HI-LITE ORANGE 930815

## (undated) DEVICE — DRAPE FLUID WARMING 52 X 60" ORS-321

## (undated) DEVICE — SU SILK 0 TIE 6X30" A306H

## (undated) DEVICE — CLIP APPLIER 09 3/8" SM LIGACLIP MCS20

## (undated) DEVICE — STPL SKIN 35W ROTATING HEAD PRW35

## (undated) DEVICE — ESU LIGASURE IMPACT OPEN SEALER/DVDR CVD LG JAW LF4418

## (undated) DEVICE — LINEN TOWEL PACK X30 5481

## (undated) DEVICE — SPONGE LAP 18X18" X8435

## (undated) DEVICE — BLADE SAW STRK STERNAL 6207-97-101

## (undated) DEVICE — SOL NACL 0.9% IRRIG 1000ML BOTTLE 2F7124

## (undated) DEVICE — Device

## (undated) DEVICE — DRAPE SLEEVE 599

## (undated) DEVICE — SUCTION TIP POOLE K770

## (undated) DEVICE — LINEN TOWEL PACK X6 WHITE 5487

## (undated) DEVICE — DRSG TELFA 3X8" 1238

## (undated) DEVICE — SU ETHIBOND 5 V-37 4X30" MB66G

## (undated) DEVICE — PACK GOWN 3/PK DISP XL SBA32GPFCB

## (undated) DEVICE — SU PROLENE 5-0 RB-1DA 36"  8556H

## (undated) DEVICE — DRAPE ISOLATION BAG 1003

## (undated) DEVICE — BONE WAX 2.5GM W31G

## (undated) DEVICE — DRAPE IOBAN INCISE 23X17" 6650EZ

## (undated) DEVICE — POST MORTEM BAG ADULT 3 ID TAG 90X36IN NON70540WM

## (undated) DEVICE — BASIN SET SINGLE STERILE 13752-624

## (undated) DEVICE — NDL BX TRU CUT 14GA 4.5" 2N2702X

## (undated) DEVICE — ESU PENCIL SMOKE EVAC W/ROCKER SWITCH 0703-047-000

## (undated) DEVICE — CLIP APPLIER 13" LG LIGACLIP MCL20

## (undated) DEVICE — SU PROLENE 4-0 SHDA 36" 8521H

## (undated) DEVICE — STPL LINEAR CUT 30X4.8MM TX30G

## (undated) DEVICE — NDL BX 18GAX25CM MC1825

## (undated) DEVICE — SU UMBILICAL TAPE .125X30" U11T

## (undated) DEVICE — CLIP APPLIER 11" MED LIGACLIP MCM30

## (undated) DEVICE — DRAPE SHEET REV FOLD 3/4 9349

## (undated) DEVICE — SUCTION TIP YANKAUER W/O VENT K86

## (undated) DEVICE — NDL COUNTER 20CT 31142493

## (undated) DEVICE — SOL WATER IRRIG 1000ML BOTTLE 2F7114

## (undated) RX ORDER — HEPARIN SODIUM 1000 [USP'U]/ML
INJECTION, SOLUTION INTRAVENOUS; SUBCUTANEOUS
Status: DISPENSED
Start: 2024-12-28